# Patient Record
Sex: FEMALE | Race: WHITE | Employment: UNEMPLOYED | URBAN - METROPOLITAN AREA
[De-identification: names, ages, dates, MRNs, and addresses within clinical notes are randomized per-mention and may not be internally consistent; named-entity substitution may affect disease eponyms.]

---

## 2018-09-26 ENCOUNTER — APPOINTMENT (OUTPATIENT)
Dept: CT IMAGING | Age: 73
DRG: 042 | End: 2018-09-26
Payer: MEDICARE

## 2018-09-26 ENCOUNTER — APPOINTMENT (OUTPATIENT)
Dept: GENERAL RADIOLOGY | Age: 73
DRG: 042 | End: 2018-09-26
Payer: MEDICARE

## 2018-09-26 ENCOUNTER — APPOINTMENT (OUTPATIENT)
Dept: MRI IMAGING | Age: 73
DRG: 042 | End: 2018-09-26
Payer: MEDICARE

## 2018-09-26 ENCOUNTER — HOSPITAL ENCOUNTER (INPATIENT)
Age: 73
LOS: 6 days | Discharge: HOME | DRG: 042 | End: 2018-10-02
Attending: EMERGENCY MEDICINE | Admitting: NEUROLOGICAL SURGERY
Payer: MEDICARE

## 2018-09-26 DIAGNOSIS — I63.9 ISCHEMIC CEREBROVASCULAR ACCIDENT (CVA) (HCC): Primary | ICD-10-CM

## 2018-09-26 PROBLEM — R47.01 EXPRESSIVE APHASIA: Status: ACTIVE | Noted: 2018-09-26

## 2018-09-26 LAB
% CKMB: 5.4 % (ref 0–3)
ABO/RH: NORMAL
ABSOLUTE EOS #: <0.03 K/UL (ref 0–0.44)
ABSOLUTE IMMATURE GRANULOCYTE: 0.05 K/UL (ref 0–0.3)
ABSOLUTE LYMPH #: 1.37 K/UL (ref 1.1–3.7)
ABSOLUTE MONO #: 0.7 K/UL (ref 0.1–1.2)
ANION GAP SERPL CALCULATED.3IONS-SCNC: 13 MMOL/L (ref 9–17)
ANTIBODY IDENTIFICATION: NORMAL
ANTIBODY SCREEN: POSITIVE
ARM BAND NUMBER: NORMAL
BASOPHILS # BLD: 1 % (ref 0–2)
BASOPHILS ABSOLUTE: 0.09 K/UL (ref 0–0.2)
BUN BLDV-MCNC: 10 MG/DL (ref 8–23)
BUN/CREAT BLD: ABNORMAL (ref 9–20)
CALCIUM SERPL-MCNC: 10.1 MG/DL (ref 8.6–10.4)
CHLORIDE BLD-SCNC: 102 MMOL/L (ref 98–107)
CK MB: 5.4 NG/ML
CKMB INTERPRETATION: ABNORMAL
CO2: 30 MMOL/L (ref 20–31)
CREAT SERPL-MCNC: 0.49 MG/DL (ref 0.5–0.9)
DAT IGG: NEGATIVE
DIFFERENTIAL TYPE: ABNORMAL
EOSINOPHILS RELATIVE PERCENT: 0 % (ref 1–4)
EXPIRATION DATE: NORMAL
GFR AFRICAN AMERICAN: >60 ML/MIN
GFR NON-AFRICAN AMERICAN: >60 ML/MIN
GFR SERPL CREATININE-BSD FRML MDRD: ABNORMAL ML/MIN/{1.73_M2}
GFR SERPL CREATININE-BSD FRML MDRD: ABNORMAL ML/MIN/{1.73_M2}
GLUCOSE BLD-MCNC: 123 MG/DL (ref 70–99)
HCT VFR BLD CALC: 47.8 % (ref 36.3–47.1)
HEMOGLOBIN: 15.4 G/DL (ref 11.9–15.1)
IMMATURE GRANULOCYTES: 1 %
INR BLD: 0.9
LYMPHOCYTES # BLD: 14 % (ref 24–43)
MCH RBC QN AUTO: 30.2 PG (ref 25.2–33.5)
MCHC RBC AUTO-ENTMCNC: 32.2 G/DL (ref 28.4–34.8)
MCV RBC AUTO: 93.7 FL (ref 82.6–102.9)
MONOCYTES # BLD: 7 % (ref 3–12)
MYOGLOBIN: 53 NG/ML (ref 25–58)
NRBC AUTOMATED: 0 PER 100 WBC
PARTIAL THROMBOPLASTIN TIME: 23.9 SEC (ref 20.5–30.5)
PDW BLD-RTO: 12.7 % (ref 11.8–14.4)
PLATELET # BLD: 156 K/UL (ref 138–453)
PLATELET ESTIMATE: ABNORMAL
PMV BLD AUTO: 12.8 FL (ref 8.1–13.5)
POTASSIUM SERPL-SCNC: 3.3 MMOL/L (ref 3.7–5.3)
PROTHROMBIN TIME: 10.2 SEC (ref 9–12)
RBC # BLD: 5.1 M/UL (ref 3.95–5.11)
RBC # BLD: ABNORMAL 10*6/UL
SEG NEUTROPHILS: 77 % (ref 36–65)
SEGMENTED NEUTROPHILS ABSOLUTE COUNT: 7.81 K/UL (ref 1.5–8.1)
SODIUM BLD-SCNC: 145 MMOL/L (ref 135–144)
TOTAL CK: 100 U/L (ref 26–192)
TROPONIN INTERP: ABNORMAL
TROPONIN INTERP: NORMAL
TROPONIN T: <0.03 NG/ML
TROPONIN T: <0.03 NG/ML
WBC # BLD: 10 K/UL (ref 3.5–11.3)
WBC # BLD: ABNORMAL 10*3/UL

## 2018-09-26 PROCEDURE — 6360000002 HC RX W HCPCS: Performed by: EMERGENCY MEDICINE

## 2018-09-26 PROCEDURE — 86900 BLOOD TYPING SEROLOGIC ABO: CPT

## 2018-09-26 PROCEDURE — 85730 THROMBOPLASTIN TIME PARTIAL: CPT

## 2018-09-26 PROCEDURE — 84484 ASSAY OF TROPONIN QUANT: CPT

## 2018-09-26 PROCEDURE — 0042T CT BRAIN PERFUSION: CPT

## 2018-09-26 PROCEDURE — 82550 ASSAY OF CK (CPK): CPT

## 2018-09-26 PROCEDURE — 86850 RBC ANTIBODY SCREEN: CPT

## 2018-09-26 PROCEDURE — S0028 INJECTION, FAMOTIDINE, 20 MG: HCPCS | Performed by: EMERGENCY MEDICINE

## 2018-09-26 PROCEDURE — 85025 COMPLETE CBC W/AUTO DIFF WBC: CPT

## 2018-09-26 PROCEDURE — 2000000003 HC NEURO ICU R&B

## 2018-09-26 PROCEDURE — 36415 COLL VENOUS BLD VENIPUNCTURE: CPT

## 2018-09-26 PROCEDURE — 99285 EMERGENCY DEPT VISIT HI MDM: CPT

## 2018-09-26 PROCEDURE — 93005 ELECTROCARDIOGRAM TRACING: CPT

## 2018-09-26 PROCEDURE — 99291 CRITICAL CARE FIRST HOUR: CPT | Performed by: NEUROLOGICAL SURGERY

## 2018-09-26 PROCEDURE — 6360000004 HC RX CONTRAST MEDICATION: Performed by: STUDENT IN AN ORGANIZED HEALTH CARE EDUCATION/TRAINING PROGRAM

## 2018-09-26 PROCEDURE — 85610 PROTHROMBIN TIME: CPT

## 2018-09-26 PROCEDURE — 86880 COOMBS TEST DIRECT: CPT

## 2018-09-26 PROCEDURE — 99222 1ST HOSP IP/OBS MODERATE 55: CPT | Performed by: PSYCHIATRY & NEUROLOGY

## 2018-09-26 PROCEDURE — 94762 N-INVAS EAR/PLS OXIMTRY CONT: CPT

## 2018-09-26 PROCEDURE — 86870 RBC ANTIBODY IDENTIFICATION: CPT

## 2018-09-26 PROCEDURE — 2500000003 HC RX 250 WO HCPCS: Performed by: EMERGENCY MEDICINE

## 2018-09-26 PROCEDURE — 82553 CREATINE MB FRACTION: CPT

## 2018-09-26 PROCEDURE — 71045 X-RAY EXAM CHEST 1 VIEW: CPT

## 2018-09-26 PROCEDURE — 2580000003 HC RX 258: Performed by: STUDENT IN AN ORGANIZED HEALTH CARE EDUCATION/TRAINING PROGRAM

## 2018-09-26 PROCEDURE — 80048 BASIC METABOLIC PNL TOTAL CA: CPT

## 2018-09-26 PROCEDURE — 6360000002 HC RX W HCPCS: Performed by: NURSE PRACTITIONER

## 2018-09-26 PROCEDURE — 2580000003 HC RX 258: Performed by: NURSE PRACTITIONER

## 2018-09-26 PROCEDURE — 70496 CT ANGIOGRAPHY HEAD: CPT

## 2018-09-26 PROCEDURE — 86901 BLOOD TYPING SEROLOGIC RH(D): CPT

## 2018-09-26 PROCEDURE — 83874 ASSAY OF MYOGLOBIN: CPT

## 2018-09-26 PROCEDURE — 70498 CT ANGIOGRAPHY NECK: CPT

## 2018-09-26 RX ORDER — METHYLPREDNISOLONE SODIUM SUCCINATE 125 MG/2ML
125 INJECTION, POWDER, LYOPHILIZED, FOR SOLUTION INTRAMUSCULAR; INTRAVENOUS ONCE
Status: COMPLETED | OUTPATIENT
Start: 2018-09-26 | End: 2018-09-26

## 2018-09-26 RX ORDER — PANTOPRAZOLE SODIUM 40 MG/1
40 TABLET, DELAYED RELEASE ORAL
Status: DISCONTINUED | OUTPATIENT
Start: 2018-09-27 | End: 2018-10-02 | Stop reason: HOSPADM

## 2018-09-26 RX ORDER — DEXTROSE MONOHYDRATE 25 G/50ML
12.5 INJECTION, SOLUTION INTRAVENOUS
Status: ACTIVE | OUTPATIENT
Start: 2018-09-26 | End: 2018-09-26

## 2018-09-26 RX ORDER — SODIUM CHLORIDE 0.9 % (FLUSH) 0.9 %
10 SYRINGE (ML) INJECTION PRN
Status: DISCONTINUED | OUTPATIENT
Start: 2018-09-26 | End: 2018-10-02 | Stop reason: HOSPADM

## 2018-09-26 RX ORDER — SODIUM CHLORIDE 9 MG/ML
INJECTION, SOLUTION INTRAVENOUS CONTINUOUS
Status: DISCONTINUED | OUTPATIENT
Start: 2018-09-26 | End: 2018-09-28

## 2018-09-26 RX ORDER — LEVETIRACETAM 15 MG/ML
1500 INJECTION INTRAVASCULAR ONCE
Status: COMPLETED | OUTPATIENT
Start: 2018-09-26 | End: 2018-09-26

## 2018-09-26 RX ORDER — 0.9 % SODIUM CHLORIDE 0.9 %
500 INTRAVENOUS SOLUTION INTRAVENOUS ONCE
Status: COMPLETED | OUTPATIENT
Start: 2018-09-26 | End: 2018-09-26

## 2018-09-26 RX ORDER — ACETAMINOPHEN 325 MG/1
650 TABLET ORAL EVERY 4 HOURS PRN
Status: DISCONTINUED | OUTPATIENT
Start: 2018-09-26 | End: 2018-10-02 | Stop reason: HOSPADM

## 2018-09-26 RX ORDER — HYDRALAZINE HYDROCHLORIDE 25 MG/1
12.5 TABLET, FILM COATED ORAL DAILY
COMMUNITY

## 2018-09-26 RX ORDER — PRAVASTATIN SODIUM 40 MG
40 TABLET ORAL DAILY
Status: ON HOLD | COMMUNITY
End: 2018-10-02 | Stop reason: HOSPADM

## 2018-09-26 RX ORDER — POTASSIUM CHLORIDE 7.45 MG/ML
10 INJECTION INTRAVENOUS
Status: COMPLETED | OUTPATIENT
Start: 2018-09-26 | End: 2018-09-26

## 2018-09-26 RX ORDER — SODIUM CHLORIDE 0.9 % (FLUSH) 0.9 %
10 SYRINGE (ML) INJECTION EVERY 12 HOURS SCHEDULED
Status: DISCONTINUED | OUTPATIENT
Start: 2018-09-26 | End: 2018-10-02 | Stop reason: HOSPADM

## 2018-09-26 RX ORDER — LABETALOL HYDROCHLORIDE 5 MG/ML
10 INJECTION, SOLUTION INTRAVENOUS
Status: DISCONTINUED | OUTPATIENT
Start: 2018-09-26 | End: 2018-10-02 | Stop reason: HOSPADM

## 2018-09-26 RX ORDER — FENTANYL CITRATE 50 UG/ML
50 INJECTION, SOLUTION INTRAMUSCULAR; INTRAVENOUS
Status: DISCONTINUED | OUTPATIENT
Start: 2018-09-26 | End: 2018-10-02 | Stop reason: HOSPADM

## 2018-09-26 RX ORDER — TROSPIUM CHLORIDE 20 MG/1
20 TABLET, FILM COATED ORAL
Status: DISCONTINUED | OUTPATIENT
Start: 2018-09-27 | End: 2018-10-02 | Stop reason: HOSPADM

## 2018-09-26 RX ORDER — SODIUM CHLORIDE 9 MG/ML
INJECTION, SOLUTION INTRAVENOUS CONTINUOUS
Status: DISCONTINUED | OUTPATIENT
Start: 2018-09-26 | End: 2018-09-26

## 2018-09-26 RX ORDER — SIMVASTATIN 20 MG
20 TABLET ORAL NIGHTLY
Status: DISCONTINUED | OUTPATIENT
Start: 2018-09-26 | End: 2018-09-27

## 2018-09-26 RX ORDER — ONDANSETRON 2 MG/ML
4 INJECTION INTRAMUSCULAR; INTRAVENOUS EVERY 6 HOURS PRN
Status: DISCONTINUED | OUTPATIENT
Start: 2018-09-26 | End: 2018-10-02 | Stop reason: HOSPADM

## 2018-09-26 RX ORDER — PROMETHAZINE HYDROCHLORIDE 25 MG/ML
12.5 INJECTION, SOLUTION INTRAMUSCULAR; INTRAVENOUS EVERY 6 HOURS PRN
Status: DISCONTINUED | OUTPATIENT
Start: 2018-09-26 | End: 2018-10-02 | Stop reason: HOSPADM

## 2018-09-26 RX ORDER — DIPHENHYDRAMINE HYDROCHLORIDE 50 MG/ML
50 INJECTION INTRAMUSCULAR; INTRAVENOUS ONCE
Status: COMPLETED | OUTPATIENT
Start: 2018-09-26 | End: 2018-09-26

## 2018-09-26 RX ORDER — LABETALOL HYDROCHLORIDE 5 MG/ML
10 INJECTION, SOLUTION INTRAVENOUS
Status: DISCONTINUED | OUTPATIENT
Start: 2018-09-26 | End: 2018-09-26

## 2018-09-26 RX ORDER — FENTANYL CITRATE 50 UG/ML
25 INJECTION, SOLUTION INTRAMUSCULAR; INTRAVENOUS
Status: DISCONTINUED | OUTPATIENT
Start: 2018-09-26 | End: 2018-10-02 | Stop reason: HOSPADM

## 2018-09-26 RX ADMIN — LEVETIRACETAM 1500 MG: 15 INJECTION INTRAVENOUS at 16:44

## 2018-09-26 RX ADMIN — SODIUM CHLORIDE 500 ML: 0.9 INJECTION, SOLUTION INTRAVENOUS at 16:13

## 2018-09-26 RX ADMIN — POTASSIUM CHLORIDE 10 MEQ: 10 INJECTION, SOLUTION INTRAVENOUS at 20:57

## 2018-09-26 RX ADMIN — METHYLPREDNISOLONE SODIUM SUCCINATE 125 MG: 125 INJECTION, POWDER, FOR SOLUTION INTRAMUSCULAR; INTRAVENOUS at 17:16

## 2018-09-26 RX ADMIN — IOPAMIDOL 50 ML: 755 INJECTION, SOLUTION INTRAVENOUS at 15:20

## 2018-09-26 RX ADMIN — Medication 10 ML: at 21:58

## 2018-09-26 RX ADMIN — POTASSIUM CHLORIDE 10 MEQ: 10 INJECTION, SOLUTION INTRAVENOUS at 23:09

## 2018-09-26 RX ADMIN — SODIUM CHLORIDE: 9 INJECTION, SOLUTION INTRAVENOUS at 16:44

## 2018-09-26 RX ADMIN — POTASSIUM CHLORIDE 10 MEQ: 10 INJECTION, SOLUTION INTRAVENOUS at 19:32

## 2018-09-26 RX ADMIN — FAMOTIDINE 20 MG: 10 INJECTION, SOLUTION INTRAVENOUS at 17:16

## 2018-09-26 RX ADMIN — IOPAMIDOL 90 ML: 755 INJECTION, SOLUTION INTRAVENOUS at 15:21

## 2018-09-26 RX ADMIN — POTASSIUM CHLORIDE 10 MEQ: 10 INJECTION, SOLUTION INTRAVENOUS at 21:58

## 2018-09-26 RX ADMIN — DIPHENHYDRAMINE HYDROCHLORIDE 50 MG: 50 INJECTION, SOLUTION INTRAMUSCULAR; INTRAVENOUS at 17:16

## 2018-09-26 NOTE — FLOWSHEET NOTE
[facility]     Emergency/Trauma Note    PATIENT NAME: Danielle Corcoran    Shift date: 9.26.2018  Shift day: Wednesday   Shift # 1    Room # 0526/0526-01   Name: Ana Niño            Age: 74 y.o. Gender: female          Anabaptism: None   Place of Mu-ism: unknown    Trauma/Incident type: Stroke Alert  Admit Date & Time: 9/26/2018  2:42 PM  TRAUMA NAME: none        PATIENT/EVENT DESCRIPTION:  Ana Niño is a 74 y.o. female who arrived as a transfer and paged as a stroke alert. Pt to be admitted to 0526/0526-01. SPIRITUAL ASSESSMENT/INTERVENTION:  Met with daughter who arrived before the patient. Daughter says that she came from two hours away. Once patient arrived,  escorted daughter back. Patient was not able to speak and indicated that she as very anxious.  reassured patient that she was in good hands and prayed for her and the family. PATIENT BELONGINGS:  No belongings noted    ANY BELONGINGS OF SIGNIFICANT VALUE NOTED:  none    REGISTRATION STAFF NOTIFIED? Yes      WHAT IS YOUR SPIRITUAL CARE PLAN FOR THIS PATIENT?:   Chaplains will remain available to provide spiritual and emotional care as needed    Electronically signed by Venkata Olson, on 9/26/2018 at 7:53 PM.  [facility]  [phone]       09/26/18 1500   Encounter Summary   Services provided to: Patient; Family   Referral/Consult From: Multi-disciplinary team   Support System Children   Continue Visiting ([phone])   Complexity of Encounter High   Length of Encounter 1 hour   Spiritual Assessment Completed Yes   Crisis   Type Stroke Alert   Assessment Anxious; Fearful;Unable to respond   Intervention Explored feelings, thoughts, concerns;Explored coping resources;Prayer; Active listening   Outcome Expressed gratitude     Electronically signed by Sugey Danielle on 9/26/2018 at 7:54 PM

## 2018-09-26 NOTE — ED NOTES
Report called to 5B RN. Pt to go to MRI first and then the floor.      Radha Fowler RN  09/26/18 3381

## 2018-09-26 NOTE — ED NOTES
Pt can state \"a little bit\", which is an improvement for speech when asked if she can talk a little better yet.      Aby Broussard RN  09/26/18 [id number]

## 2018-09-26 NOTE — CARE COORDINATION
Case Management Initial Discharge Plan  Titi Heredia,             Met with:patient and 2 daughters to discuss discharge plans. Information verified: address, contacts, phone number, , insurance Yes  PCP: Michoacano Melgar  Date of last visit:     Insurance Provider: [facility]    Discharge Planning    Living Arrangements:  Alone   Support Systems:  Children, Family Members    Home has 1 stories  0 stairs to climb to get into front door, na stairs to climb to reach second floor  Location of bedroom/bathroom in home first floor     Patient able to perform ADL's:Independent    Current Services (outpatient & in home) none  DME equipment: none   DME provider: none     Pharmacy: [facility] in [place] Medications:  No  Does patient want to participate in local refill/ meds to beds program?  No    Potential Assistance Needed:  N/A    Patient agreeable to home care: Yes  Freedom of choice provided:  n/a    Prior SNF/Rehab Placement and Facility: no  Agreeable to SNF/Rehab: Yes  [place] of choice provided: n/a   Evaluation: no    Expected Discharge date:     Patient expects to be discharged to: Follow Up Appointment: Best Day/ Time:      Transportation provider: Family   Transportation arrangements needed for discharge: No    Readmission Risk              Risk of Unplanned Readmission:        0               Does patient have a readmission risk score greater than 14?: No  If yes, follow-up appointment must be made within 7 days of discharge. Discharge Plan: discharge plan to be determined.           Electronically signed by Spencer Richardson RN on 18 at 5:23 PM

## 2018-09-26 NOTE — ED PROVIDER NOTES
[facility]     Emergency Department     Faculty Note/ Attestation      Pt Name: Alyssa Zuniga                                       MRN: [id number]  Ar[place]gfurt 1945  Date of evaluation: 9/26/2018    Patients PCP:    Kortney Vargas  I performed a history and physical examination of the patient and discussed management with the resident. I reviewed the residents note and agree with the documented findings and plan of care. Any areas of disagreement are noted on the chart. I was personally present for the key portions of any procedures. I have documented in the chart those procedures where I was not present during the key portions. I have reviewed the emergency nurses triage note. I agree with the chief complaint, past medical history, past surgical history, allergies, medications, social and family history as documented unless otherwise noted below. For Physician Assistant/ Nurse Practitioner cases/documentation I have personally evaluated this patient and have completed at least one if not all key elements of the E/M (history, physical exam, and MDM). Additional findings are as noted. Initial Screens:  NIH Stroke Scale  NIH Stroke Scale Assessed: Yes  Interval: Baseline  Level of Consciousness (1a. ): Alert  LOC Questions (1b. ):  Incorrect  LOC Commands (1c. ): Obeys both correctly  Best Gaze (2. ): Normal  Visual (3. ): No visual loss  Facial Palsy (4. ): Normal  Motor Arm, Left (5a. ): No drift  Motor Arm, Right (5b. ): No drift  Motor Leg, Left (6a. ): No drift  Motor Leg, Right (6b. ): No drift  Limb Ataxia (7. ): Absent  Sensory (8. ): Normal  Best Language (9. ): Mute  Dysarthria (10. ): Near to unintelligible  Extinction and Inattention (11): No neglect  Total: 7     [place] Coma Scale  Eye Opening: Spontaneous  Best Verbal Response: Oriented  Best Motor Response: Obeys commands  Jason Coma Scale Score: 15    Vitals:    Vitals:    09/26/18 1449   BP: (!)
 CT Brain Perfusion    STROKE PANEL    Inpatient consult to Stroke Team     MEDICATION ORDERS:  No orders of the defined types were placed in this encounter. Differential diagnosis      Stroke/ TIA/ Facial Droop DDX:   Stroke/ TIA, Vascular, Infectious/inflammatory, Neoplastic/neurological, Drug induced, Iatrogenic, Cardiopulmonary, Autoimmune, Endocrine, Degenerative, Psychogenic/psychiatric, MSK    INITIAL NIH STROKE SCALE:  NIH Stroke Scale  NIH Stroke Scale Assessed: Yes  Interval: Baseline  Level of Consciousness (1a. ): Alert  LOC Questions (1b. ): Incorrect  LOC Commands (1c. ): Obeys both correctly  Best Gaze (2. ): Normal  Visual (3. ): No visual loss  Facial Palsy (4. ): Normal  Motor Arm, Left (5a. ): No drift  Motor Arm, Right (5b. ): No drift  Motor Leg, Left (6a. ): No drift  Motor Leg, Right (6b. ): No drift  Limb Ataxia (7. ): Absent  Sensory (8. ): Normal  Best Language (9. ): Mute  Dysarthria (10. ): Near to unintelligible  Extinction and Inattention (11): No neglect  Total: 7    0 = no stroke  14 = minor stroke  515 = moderate stroke  1520 = moderate/severe stroke  2142 = severe stroke    TPA in Acute Stroke:    When was the patient's last known well (LKW)? 11 AM   Did the patient arrive in the ED within 2 hours of LKW? No   Was TPA initiated within 3 hours of the LKW? Yes     If the patient arrived within 2 hours of LKW and did not receive TPA, what was the reason?      Diagnostic Results     LABS:  Results for orders placed or performed during the hospital encounter of 09/26/18   STROKE PANEL   Result Value Ref Range    WBC 10.0 3.5 - 11.3 k/uL    RBC 5.10 3.95 - 5.11 m/uL    Hemoglobin 15.4 (H) 11.9 - 15.1 g/dL    Hematocrit 47.8 (H) 36.3 - 47.1 %    MCV 93.7 82.6 - 102.9 fL    MCH 30.2 25.2 - 33.5 pg    MCHC 32.2 28.4 - 34.8 g/dL    RDW 12.7 11.8 - 14.4 %    Platelets 830 119 - 203 k/uL    MPV 12.8 8.1 - 13.5 fL    NRBC Automated 0.0 0.0 per 100 WBC    Differential Type NOT REPORTED
RECENT VITALS:     Temp: 97.9 °F (36.6 °C),  Pulse: 61, Resp: 16, BP: (!) 141/63, SpO2: 100 %    This patient is a 74 y.o. Female with strokelike symptoms with expressive aphasia. Patient given TPA. Patient admitted to neuro critical care unit. Pending bed placement. Approximately 5:00 patient began to develop swelling to anterior upper lip. Patient has no other swelling of her lips, no tongue swelling, no feeling like her throat is closing. This time we initiated protocol for angioedema induced by TPA. Patient given 125 mg Solu-Medrol, 50 mg Benadryl, 20 mg Pepcid. I spoke with neuro critical care physician assistant who recommended no MRI at this time and to have patient transferred to floor. OUTSTANDING TASKS / RECOMMENDATIONS:    1. Pending transfer to floor     FINAL IMPRESSION:     1.  Ischemic cerebrovascular accident (CVA) [facility])        DISPOSITION:         DISPOSITION:  []  Discharge   [x]  Transfer -  [place]   []  Admission -     []  Against Medical Advice   []  [place]   FOLLOW-UP: [address] [phone]           DISCHARGE MEDICATIONS: Current Discharge Medication List              Ravinder Guerrero DO  Emergency Medicine Resident  [address] Ravinder Guerrero [place]  Resident  09/26/18 6249

## 2018-09-27 ENCOUNTER — APPOINTMENT (OUTPATIENT)
Dept: MRI IMAGING | Age: 73
DRG: 042 | End: 2018-09-27
Payer: MEDICARE

## 2018-09-27 ENCOUNTER — APPOINTMENT (OUTPATIENT)
Dept: GENERAL RADIOLOGY | Age: 73
DRG: 042 | End: 2018-09-27
Payer: MEDICARE

## 2018-09-27 LAB
ABSOLUTE EOS #: <0.03 K/UL (ref 0–0.44)
ABSOLUTE IMMATURE GRANULOCYTE: 0.03 K/UL (ref 0–0.3)
ABSOLUTE LYMPH #: 0.72 K/UL (ref 1.1–3.7)
ABSOLUTE MONO #: 0.04 K/UL (ref 0.1–1.2)
ANION GAP SERPL CALCULATED.3IONS-SCNC: 10 MMOL/L (ref 9–17)
BASOPHILS # BLD: 0 % (ref 0–2)
BASOPHILS ABSOLUTE: <0.03 K/UL (ref 0–0.2)
BUN BLDV-MCNC: 15 MG/DL (ref 8–23)
BUN/CREAT BLD: ABNORMAL (ref 9–20)
CALCIUM IONIZED: 1.13 MMOL/L (ref 1.13–1.33)
CALCIUM SERPL-MCNC: 9.2 MG/DL (ref 8.6–10.4)
CHLORIDE BLD-SCNC: 106 MMOL/L (ref 98–107)
CHOLESTEROL/HDL RATIO: 1.8
CHOLESTEROL: 199 MG/DL
CO2: 25 MMOL/L (ref 20–31)
CREAT SERPL-MCNC: 0.57 MG/DL (ref 0.5–0.9)
DIFFERENTIAL TYPE: ABNORMAL
EOSINOPHILS RELATIVE PERCENT: 0 % (ref 1–4)
ESTIMATED AVERAGE GLUCOSE: 117 MG/DL
GFR AFRICAN AMERICAN: >60 ML/MIN
GFR NON-AFRICAN AMERICAN: >60 ML/MIN
GFR SERPL CREATININE-BSD FRML MDRD: ABNORMAL ML/MIN/{1.73_M2}
GFR SERPL CREATININE-BSD FRML MDRD: ABNORMAL ML/MIN/{1.73_M2}
GLUCOSE BLD-MCNC: 156 MG/DL (ref 70–99)
HBA1C MFR BLD: 5.7 % (ref 4–6)
HCT VFR BLD CALC: 46 % (ref 36.3–47.1)
HDLC SERPL-MCNC: 112 MG/DL
HEMOGLOBIN: 14.7 G/DL (ref 11.9–15.1)
IMMATURE GRANULOCYTES: 0 %
LDL CHOLESTEROL: 79 MG/DL (ref 0–130)
LV EF: 55 %
LVEF MODALITY: NORMAL
LYMPHOCYTES # BLD: 8 % (ref 24–43)
MAGNESIUM: 2 MG/DL (ref 1.6–2.6)
MCH RBC QN AUTO: 30.8 PG (ref 25.2–33.5)
MCHC RBC AUTO-ENTMCNC: 32 G/DL (ref 28.4–34.8)
MCV RBC AUTO: 96.2 FL (ref 82.6–102.9)
MONOCYTES # BLD: 1 % (ref 3–12)
NRBC AUTOMATED: 0 PER 100 WBC
PARTIAL THROMBOPLASTIN TIME: 25.3 SEC (ref 20.5–30.5)
PARTIAL THROMBOPLASTIN TIME: 44.6 SEC (ref 20.5–30.5)
PARTIAL THROMBOPLASTIN TIME: 73 SEC (ref 20.5–30.5)
PDW BLD-RTO: 13 % (ref 11.8–14.4)
PHOSPHORUS: 3.2 MG/DL (ref 2.6–4.5)
PLATELET # BLD: ABNORMAL K/UL (ref 138–453)
PLATELET ESTIMATE: ABNORMAL
PLATELET, FLUORESCENCE: 173 K/UL (ref 138–453)
PLATELET, IMMATURE FRACTION: 20.4 % (ref 1.1–10.3)
PMV BLD AUTO: ABNORMAL FL (ref 8.1–13.5)
POTASSIUM SERPL-SCNC: 3.8 MMOL/L (ref 3.7–5.3)
RBC # BLD: 4.78 M/UL (ref 3.95–5.11)
RBC # BLD: ABNORMAL 10*6/UL
SEG NEUTROPHILS: 91 % (ref 36–65)
SEGMENTED NEUTROPHILS ABSOLUTE COUNT: 7.77 K/UL (ref 1.5–8.1)
SODIUM BLD-SCNC: 141 MMOL/L (ref 135–144)
TRIGL SERPL-MCNC: 39 MG/DL
VLDLC SERPL CALC-MCNC: NORMAL MG/DL (ref 1–30)
WBC # BLD: 8.7 K/UL (ref 3.5–11.3)
WBC # BLD: ABNORMAL 10*3/UL

## 2018-09-27 PROCEDURE — 6360000002 HC RX W HCPCS: Performed by: STUDENT IN AN ORGANIZED HEALTH CARE EDUCATION/TRAINING PROGRAM

## 2018-09-27 PROCEDURE — 70551 MRI BRAIN STEM W/O DYE: CPT

## 2018-09-27 PROCEDURE — 85025 COMPLETE CBC W/AUTO DIFF WBC: CPT

## 2018-09-27 PROCEDURE — 6370000000 HC RX 637 (ALT 250 FOR IP): Performed by: NURSE PRACTITIONER

## 2018-09-27 PROCEDURE — G8997 SWALLOW GOAL STATUS: HCPCS

## 2018-09-27 PROCEDURE — 80048 BASIC METABOLIC PNL TOTAL CA: CPT

## 2018-09-27 PROCEDURE — 2000000003 HC NEURO ICU R&B

## 2018-09-27 PROCEDURE — 99291 CRITICAL CARE FIRST HOUR: CPT | Performed by: NEUROLOGICAL SURGERY

## 2018-09-27 PROCEDURE — 92523 SPEECH SOUND LANG COMPREHEN: CPT

## 2018-09-27 PROCEDURE — 82330 ASSAY OF CALCIUM: CPT

## 2018-09-27 PROCEDURE — G0008 ADMIN INFLUENZA VIRUS VAC: HCPCS | Performed by: NEUROLOGICAL SURGERY

## 2018-09-27 PROCEDURE — 92610 EVALUATE SWALLOWING FUNCTION: CPT

## 2018-09-27 PROCEDURE — 6370000000 HC RX 637 (ALT 250 FOR IP): Performed by: STUDENT IN AN ORGANIZED HEALTH CARE EDUCATION/TRAINING PROGRAM

## 2018-09-27 PROCEDURE — 85730 THROMBOPLASTIN TIME PARTIAL: CPT

## 2018-09-27 PROCEDURE — G9174 SPEECH LANG CURRENT STATUS: HCPCS

## 2018-09-27 PROCEDURE — G8996 SWALLOW CURRENT STATUS: HCPCS

## 2018-09-27 PROCEDURE — 84100 ASSAY OF PHOSPHORUS: CPT

## 2018-09-27 PROCEDURE — G8979 MOBILITY GOAL STATUS: HCPCS

## 2018-09-27 PROCEDURE — 74230 X-RAY XM SWLNG FUNCJ C+: CPT

## 2018-09-27 PROCEDURE — G8987 SELF CARE CURRENT STATUS: HCPCS

## 2018-09-27 PROCEDURE — 97530 THERAPEUTIC ACTIVITIES: CPT

## 2018-09-27 PROCEDURE — 97162 PT EVAL MOD COMPLEX 30 MIN: CPT

## 2018-09-27 PROCEDURE — G8988 SELF CARE GOAL STATUS: HCPCS

## 2018-09-27 PROCEDURE — 85055 RETICULATED PLATELET ASSAY: CPT

## 2018-09-27 PROCEDURE — 97165 OT EVAL LOW COMPLEX 30 MIN: CPT

## 2018-09-27 PROCEDURE — 90686 IIV4 VACC NO PRSV 0.5 ML IM: CPT | Performed by: NEUROLOGICAL SURGERY

## 2018-09-27 PROCEDURE — 83735 ASSAY OF MAGNESIUM: CPT

## 2018-09-27 PROCEDURE — 2580000003 HC RX 258: Performed by: NURSE PRACTITIONER

## 2018-09-27 PROCEDURE — 92611 MOTION FLUOROSCOPY/SWALLOW: CPT

## 2018-09-27 PROCEDURE — 97535 SELF CARE MNGMENT TRAINING: CPT

## 2018-09-27 PROCEDURE — 83036 HEMOGLOBIN GLYCOSYLATED A1C: CPT

## 2018-09-27 PROCEDURE — 6360000002 HC RX W HCPCS: Performed by: NEUROLOGICAL SURGERY

## 2018-09-27 PROCEDURE — 36415 COLL VENOUS BLD VENIPUNCTURE: CPT

## 2018-09-27 PROCEDURE — G8978 MOBILITY CURRENT STATUS: HCPCS

## 2018-09-27 PROCEDURE — G9175 SPEECH LANG GOAL STATUS: HCPCS

## 2018-09-27 PROCEDURE — 93306 TTE W/DOPPLER COMPLETE: CPT

## 2018-09-27 PROCEDURE — 80061 LIPID PANEL: CPT

## 2018-09-27 RX ORDER — HEPARIN SODIUM 10000 [USP'U]/100ML
12 INJECTION, SOLUTION INTRAVENOUS CONTINUOUS
Status: DISCONTINUED | OUTPATIENT
Start: 2018-09-27 | End: 2018-09-28

## 2018-09-27 RX ORDER — ATORVASTATIN CALCIUM 40 MG/1
40 TABLET, FILM COATED ORAL NIGHTLY
Status: DISCONTINUED | OUTPATIENT
Start: 2018-09-27 | End: 2018-10-02 | Stop reason: HOSPADM

## 2018-09-27 RX ADMIN — PANTOPRAZOLE SODIUM 40 MG: 40 TABLET, DELAYED RELEASE ORAL at 08:17

## 2018-09-27 RX ADMIN — TROSPIUM CHLORIDE 20 MG: 20 TABLET, FILM COATED ORAL at 17:38

## 2018-09-27 RX ADMIN — HEPARIN SODIUM AND DEXTROSE 12 UNITS/KG/HR: 10000; 5 INJECTION INTRAVENOUS at 09:57

## 2018-09-27 RX ADMIN — TROSPIUM CHLORIDE 20 MG: 20 TABLET, FILM COATED ORAL at 08:17

## 2018-09-27 RX ADMIN — Medication 10 ML: at 08:17

## 2018-09-27 RX ADMIN — DESMOPRESSIN ACETATE 40 MG: 0.2 TABLET ORAL at 21:05

## 2018-09-27 RX ADMIN — INFLUENZA A VIRUS A/MICHIGAN/45/2015 X-275 (H1N1) ANTIGEN (FORMALDEHYDE INACTIVATED), INFLUENZA A VIRUS A/SINGAPORE/INFIMH-16-0019/2016 IVR-186 (H3N2) ANTIGEN (FORMALDEHYDE INACTIVATED), INFLUENZA B VIRUS B/PHUKET/3073/2013 ANTIGEN (FORMALDEHYDE INACTIVATED), AND INFLUENZA B VIRUS B/MARYLAND/15/2016 BX-69A ANTIGEN (FORMALDEHYDE INACTIVATED) 0.5 ML: 15; 15; 15; 15 INJECTION, SUSPENSION INTRAMUSCULAR at 08:29

## 2018-09-27 RX ADMIN — SODIUM CHLORIDE: 9 INJECTION, SOLUTION INTRAVENOUS at 14:33

## 2018-09-27 ASSESSMENT — PAIN SCALES - GENERAL
PAINLEVEL_OUTOF10: 0

## 2018-09-27 NOTE — H&P
Wrong template
Disease Maternal Grandfather     Heart Disease Paternal Grandfather        Allergies:    Patient has no known allergies. Medications Prior to Admission:    Not in a hospital admission. Current Medications:  No current facility-administered medications for this encounter. REVIEW OF SYSTEMS     CONSTITUTIONAL: negative for fatigue and malaise   EYES: negative for double vision and photophobia    HEENT: negative for tinnitus and sore throat   RESPIRATORY: negative for cough, shortness of breath   CARDIOVASCULAR: negative for chest pain, palpitations, or syncope   GASTROINTESTINAL: negative for abdominal pain, nausea, vomiting, diarrhea, or constipation    GENITOURINARY: negative for incontinence or retention    MUSCULOSKELETAL: negative for neck or back pain, negative for extremity pain   NEUROLOGICAL: Positive expressive aphasia. Negative for seizures, headaches, weakness, numbness, confusion, dysarthria. PSYCHIATRIC: negative for agitation, hallucination, SI/HI   SKIN Negative for spontaneous contusions, rashes, or lesions      ROS limited due to severe expressive aphasia (mute), but patient able to nod yes/no appropriately. PHYSICAL EXAM:     BP (!) 151/83   Pulse 90   Temp 98.1 °F (36.7 °C) (Oral)   Resp 15   Wt 107 lb (48.5 kg)   BMI 19.57 kg/m²     Estimated body mass index is 19.57 kg/m² as calculated from the following:    Height as of 9/18/12: 5' 2\" (1.575 m). Weight as of this encounter: 107 lb (48.5 kg).  []<16 Severe malnutrition  []1616.99 Moderate malnutrition  []1718.49 Mild malnutrition  [x]18.524.9 Normal  []2529.9 Overweight (not obese)  []3034.9 Obese class 1 (Low Risk)  []3539.9 Obese class 2 (Moderate Risk)  []?40 Obese class 3 (High Risk)    PHYSICAL EXAM:  CONSTITUTIONAL:  Well developed, well nourished, alert and oriented x3- able to nod appropriately, in no acute distress. GCS 15. Nontoxic. Severe expressive aphasia [facility]).    HEAD:  normocephalic, atraumatic

## 2018-09-27 NOTE — PROGRESS NOTES
Speech Language Pathology  Facility/Department: [address]   BEDSIDE SWALLOW EVALUATION    NAME: Erica Leach  : 1945  MRN: [id number]    ADMISSION DATE: 2018  ADMITTING DIAGNOSIS: has Ischemic cerebrovascular accident (CVA) ([address] and Expressive aphasia on her problem list.      Date of Eval: 2018  Evaluating Therapist: Kimberly Leigh    Current Diet level: NPO     Primary ComplaintLinda Corcoran is a 74 y.o. female who presented to the emergency department with complete aphasia. The last known well was  11:00 this AM. Patient was seen at [facility] and had a CT scan which was negative. The greater spoke to Dr. Felicita Lo. who agreed to accept the patient here. The patient was subsequently transferred by [facility] and given TPA with the bolus at 1402 followed by an infusion at 1403. NH reported by [facility] was a 3 - she was a 7 on arrival here. Pain:  Pain Assessment  Patient Currently in Pain: No  Pain Assessment: 0-10  Pain Level: 0    Reason for Referral  Erica Leach was referred for a bedside swallow evaluation to assess the efficiency of her swallow function, identify signs and symptoms of aspiration and make recommendations regarding safe dietary consistencies, effective compensatory strategies, and safe eating environment. Impression  Pt presents with +s/s of aspiration for soft solid and nectar thick liquid by tsp. Recommend pt remain NPO until MBSS completed to further assess swallow function  . Dysphagia Diagnosis: Moderate to severe pharyngeal stage dysphagia; Suspected needs further assessment  Dysphagia Outcome Severity Scale: Level 1: Severe dysphagia- NPO. Unable to tolerate any PO safely     Treatment Plan  Requires SLP Intervention: Yes  Duration/Frequency of Treatment: 3-5x/week  D/C Recommendations:  To be determined       Recommended Diet and Intervention  Diet Solids Recommendation: NPO  Liquid Consistency Recommendation: NPO     Therapeutic

## 2018-09-27 NOTE — PROCEDURES
intake and complete Modified Barium Swallow Study should they occur. Results and recommendations reported to RN. Patient Position: Lateral and Patient Degrees: 90      Consistencies Administered: Reg solid; Soft solid;Puree; Thin cup;Nectar  teaspoon      Recommended Diet:  Solid consistency: Regular  Liquid consistency: Thin  Liquid administration via: Cup    Medication administration: PO    Safe Swallow Protocol:  Supervision: Close  Compensatory Swallowing Strategies: Alternate solids and liquids;Eat/Feed slowly;Upright as possible for all oral intake;Small bites/sips      Recommendations/Treatment  Requires SLP Intervention: Yes        D/C Recommendations: Inpatient rehabilitation  Postural Changes and/or Swallow Maneuvers: Upright 90 degrees      Recommended Exercises:    Therapeutic Interventions: Laryngeal exercises;Diet tolerance monitoring; Tongue base strengthening;Pharyngeal exercises         Education: Images and recommendations were reviewed with pt following this exam.   Patient Education: yes  Patient Education Response: Verbalizes understanding    Prognosis  Prognosis for safe diet advancement: good  Duration/Frequency of Treatment  Duration/Frequency of Treatment: 3-5x week      Goals:    Long Term: To Maximize safety with intake, optimize nutrition/hydration and minimize risk for aspiration. Short Term:     Goal 1: O/M treatment program for pharyngeal stasis  Goal 2: The patient will tolerate recommended diet without observed clinical signs of aspiration      Oral Preparation / Oral Phase  Oral Phase: WFL  Oral Phase: A-P transit and oral manipulation functional for consistencies tested. Mild oral stasis noted    Pharyngeal Phase  Pharyngeal Phase: WFL    Pharyngeal: Thin cup: trace flash penetration with no apsiration and mild stasis. Thick tsp, Puree, Fruit and Cookie: no penetration and no aspiration with mild vallec/pyriform stasis.     Dysphagia Outcome Severity Scale: Level 6: Within functional limits/Modified independence  Penetration-Aspiration Scale (PAS): 2 - Material enters the airway, remains above the vocal folds, and is ejected from the airway    Esophageal Phase  Esophageal Screen: [facility]        Pain   Patient Currently in Pain: No  Pain Level: 0    G-Code:  SLP G-Codes  Functional Limitations: Swallowing  Swallow Current Status (): At least 1 percent but less than 20 percent impaired, limited or restricted  Swallow Goal Status (): 0 percent impaired, limited or restricted  Other Speech-Language Pathology Functional Limitation ():  At least 1 percent but less than 20 percent impaired, limited or restricted  Other Speech-Language Pathology Functional Limitation Goal Status (): 0 percent impaired, limited or restricted      Therapy Time:   Individual Concurrent Group Co-treatment   Time In 1130         Time Out 1145         Minutes 15                   Viktoria Bright, 9/27/2018, 1:20 PM

## 2018-09-27 NOTE — PROGRESS NOTES
Physical Therapy    Facility/Department: [address]  Initial Assessment    NAME: Donis Engle  : 1945  MRN: [id number]    Chief Complaint   Patient presents with    Cerebrovascular Accident     Date of Service: 2018    Discharge Recommendations:  Home with assist PRN with use of SPC, Outpatient PT (for dynamic balance and gait unsteadiness)  PT Equipment Recommendations  Equipment Needed: No (Pt states she has [facility])    Patient Diagnosis(es): The encounter diagnosis was Ischemic cerebrovascular accident (CVA) ([address] has a past medical history of Cataract; Headache(784.0); Hypertension; Reflux; and Unspecified cerebral artery occlusion with cerebral infarction. has a past surgical history that includes Splenectomy, total.    Restrictions  Restrictions/Precautions  Restrictions/Precautions: Fall Risk  Required Braces or Orthoses?: No  Vision/Hearing  Vision: Impaired  Vision Exceptions: Wears glasses at all times  Hearing: Within functional limits     Subjective  General  Patient assessed for rehabilitation services?: Yes  Response To Previous Treatment: Not applicable  Family / Caregiver Present: No  Follows Commands: Within Functional Limits  Subjective  Subjective: Pt and RN agreeable to treatment this date.  Pt resting in bed upon arrival.   Pain Screening  Patient Currently in Pain: No  Vital Signs  Patient Currently in Pain: Denies       Orientation  Orientation  Overall Orientation Status: Within Normal Limits    Social/Functional History  Social/Functional History  Lives With: Alone  Type of Home: Apartment  Home Layout: One level  Home Access: Level entry  Bathroom Shower/Tub: Tub/Shower unit  Bathroom Toilet: Standard  Bathroom Equipment: Grab bars in shower, Grab bars around toilet  Bathroom Accessibility: Accessible  Home Equipment: Isac Mccallum Help From: Family, Friend(s)  ADL Assistance: Independent  Homemaking Assistance: Independent  Homemaking Responsibilities: Yes  Ambulation educated on temporary need for cane  Barriers to Learning: None  REQUIRES PT FOLLOW UP: Yes  Activity Tolerance  Activity Tolerance: Patient Tolerated treatment well     Plan   Plan  Times per week: 4-5  Times per day: Daily  Current Treatment Recommendations: Strengthening, Balance Training, Functional Mobility Training, Transfer Training, Endurance Training, Gait Training, Stair training, ADL/Self-care Training  Safety Devices  Type of devices: Left in bed, Nurse notified, Gait belt, Call light within reach, Patient at risk for falls  Restraints  Initially in place: No    G-Code  PT G-Codes  Functional Assessment Tool Used: [facility]  Score: 20  Functional Limitation: Mobility: Walking and moving around  Mobility: Walking and Moving Around Current Status (): At least 20 percent but less than 40 percent impaired, limited or restricted  Mobility: Walking and Moving Around Goal Status ():  At least 1 percent but less than 20 percent impaired, limited or restricted    AM-PAC Score  AM-PAC Inpatient Mobility Raw Score : 20  AM-PAC Inpatient T-Scale Score : 47.67  Mobility Inpatient CMS 0-100% Score: 35.83  Mobility Inpatient CMS G-Code Modifier : CJ        Goals  Short term goals  Time Frame for Short term goals: 14 visits  Short term goal 1: Pt will ambulate 250ft with least restrictive device independentlu  Short term goal 2: Pt will tolerate 30 minutes of exercise  Short term goal 3: Pt will be independent with transfers  Short term goal 4: Pt will be independent bed mobility  Short term goal 5: Pt will demonstrate good standing balance   Patient Goals   Patient goals : Pt wants to return home       Therapy Time   Individual Concurrent Group Co-treatment   Time In 0822         Time Out 0844         Minutes 22         Timed Code Treatment Minutes: 8 Minutes       EUNICE Juarez  Evaluation/treatment performed by Student PT under the supervision of co-signing PT who agrees with all evaluation/treatment and documentation.

## 2018-09-27 NOTE — PROGRESS NOTES
injection 12.5 mg, 12.5 mg, Intramuscular, Q6H PRN  pantoprazole (PROTONIX) tablet 40 mg, 40 mg, Oral, QAM AC  trospium (SANCTURA) tablet 20 mg, 20 mg, Oral, BID AC  simvastatin (ZOCOR) tablet 20 mg, 20 mg, Oral, Nightly  sodium chloride flush 0.9 % injection 10 mL, 10 mL, Intravenous, 2 times per day  sodium chloride flush 0.9 % injection 10 mL, 10 mL, Intravenous, PRN  ondansetron (ZOFRAN) injection 4 mg, 4 mg, Intravenous, Q6H PRN  0.9 % sodium chloride infusion, , Intravenous, Continuous  labetalol (NORMODYNE;TRANDATE) injection 10 mg, 10 mg, Intravenous, Q1H PRN    REVIEW OF SYSTEMS     CONSTITUTIONAL: negative for fatigue and malaise   EYES: negative for double vision and photophobia    HEENT: negative for tinnitus and sore throat   RESPIRATORY: negative for cough, shortness of breath   CARDIOVASCULAR: negative for chest pain, palpitations, or syncope   GASTROINTESTINAL: negative for abdominal pain, nausea, vomiting, diarrhea, or constipation    GENITOURINARY: negative for incontinence or retention    MUSCULOSKELETAL: negative for neck or back pain, negative for extremity pain   NEUROLOGICAL: Positive expressive aphasia. Negative for seizures, headaches, weakness, numbness, confusion, dysarthria. PSYCHIATRIC: negative for agitation, hallucination, SI/HI   SKIN Negative for spontaneous contusions, rashes, or lesions      ROS limited due to severe expressive aphasia (mute), but patient able to nod yes/no appropriately. PHYSICAL EXAM:     /62   Pulse 61   Temp 98.1 °F (36.7 °C) (Oral)   Resp 12   Ht 4' 11\" (1.499 m)   Wt 114 lb (51.7 kg)   SpO2 98%   BMI 23.03 kg/m²     Estimated body mass index is 23.03 kg/m² as calculated from the following:    Height as of this encounter: 4' 11\" (1.499 m).     Weight as of this encounter: 114 lb (51.7 kg).  []<16 Severe malnutrition  []1616.99 Moderate malnutrition  []1718.49 Mild malnutrition  [x]18.524.9 Normal  []2529.9 Overweight (not Problem List   Diagnosis    Ischemic cerebrovascular [facility] ([address] [address]    Expressive aphasia       ASSESSMENT:     This is a 74 y.o. female with a history of HTN and remote ICH (2012 with negative angio at that time) admitted to the Neuro ICU for management of acute onset expressive aphasia s/p TPA administration. Patient care will be discussed with attending, will reevaluate patient along with attending.      PLAN/MEDICAL DECISION MAKING:    NEUROLOGIC:  - Acute onset severe expressive aphasia  - CT Head negative at outlying hospital  - TPA administered 9/26/18 at 1403  - Post TPA precautions  - CTA Head/Neck with no high grade stenosis or focal occlusion, old hypodensity right frontal parietal lobe and left cerebellum  - CT Brain Perfusion with apparent perfusion abnormality within right parieto-occipital lobe felt to be artifactual  - Case discussed with Eli; no angio/thrombectomy at this time  - Neuro Endovascular following; continue low dose heparin infusion without bolus  - Increase statin to high dose, atorvastatin 40 mg  Daily   - Neuro checks per protocol     CARDIOVASCULAR:  - Goal -160  - Hold home antihypertensives for now  - PRN Labetalol  - Echocardiogram done , f/u report   - Continue telemetry monitoring     PULMONARY:  - Maintaining O2 sats on room air  - CXR with no acute findings  - Monitor respiratory status    RENAL/FLUID/ELECTROLYTE:  - BUN 10/Creatinine 0.49  - Monitor I&O  - IVF: NS @125ml/hr per Eli  - Hypokalemia, K 3.3, replace with 40meq KCl  - Replace electrolytes PRN  - Daily BMP    GI/NUTRITION:  Start on regular diet , passed video swallow  - GI Prophylaxis: Continue home Omeprazole  - Bowel Regimen: Milk of Magnesia PRN    ID/HEME:  - Monitor for fevers  - PRN Tylenol  - No leukocytosis,   - H&H 15.4/47.8  - Platelets 574    ENDOCRINE:  - Monitor blood glucose, goal <180  - F/U Hemoglobin A1C    OTHER:  - PT/OT/SLP eval     DVT PROPHYLAXIS:  - SCD sleeves - Thigh

## 2018-09-27 NOTE — PROGRESS NOTES
Speech Language Pathology  Facility/Department: [facility] 5B NSU  Initial Speech/Language/Cognitive Assessment    NAME: Percy Rivera  : 1945   MRN: [id number]  ADMISSION DATE: 2018  ADMITTING DIAGNOSIS: has Ischemic cerebrovascular accident (CVA) ([address] and Expressive aphasia on her problem list.    Date of Eval: 2018   Evaluating Therapist: Palak Alves    Primary Complaint: Ti Corcoran is a 74 y. o. female with a history of HTN and remote ICH ( with negative angio at that time) who presents as a transfer from [place] [id number] via [address] s/p tPA administration for severe expressive aphasia. Garry Lute reportedly 3 at [address] reportedly negative.  TPA administration started at [phone] in consultation with Dr. Vasquez Espinosa arrival to the ED, NIH 5 for severe expressive aphasia; patient is mute.  She follows commands and nods appropriately.  Moves all extremities purposefully and equally without drift.  Denies vision changes, numbness/tingling, headache. Pain:  Pain Assessment  Patient Currently in Pain: No  Pain Assessment: 0-10  Pain Level: 0    Assessment:    Pt presents with mild to moderate cognitive/linguistic deficits characterized by impaired word finding and verbal reasoning. Increased processing time noted. No dysarthria or facial weakness noted. ST to follow up and provide treatment to address noted deficits. Education provided. ST recommends inpatient rehab at this time. Recommendations:  Requires SLP Intervention: Yes  Duration/Frequency of Treatment: 3-5x week  D/C Recommendations: Inpatient rehabilitation       Plan:   Goals:  Short-term Goals  Goal 1: Pt will complete higher level word finding tasks with 90% accuracy. Goal 2: Pt will complete verbal reasoning tasks with 90% accuracy.    Patient/family involved in developing goals and treatment plan: yes    Subjective:  General  Chart Reviewed: Yes  Family / Caregiver Present: Yes (daughters were

## 2018-09-28 ENCOUNTER — APPOINTMENT (OUTPATIENT)
Dept: CT IMAGING | Age: 73
DRG: 042 | End: 2018-09-28
Payer: MEDICARE

## 2018-09-28 LAB
ABSOLUTE EOS #: <0.03 K/UL (ref 0–0.44)
ABSOLUTE IMMATURE GRANULOCYTE: 0.07 K/UL (ref 0–0.3)
ABSOLUTE LYMPH #: 2.33 K/UL (ref 1.1–3.7)
ABSOLUTE MONO #: 1.26 K/UL (ref 0.1–1.2)
ANION GAP SERPL CALCULATED.3IONS-SCNC: 12 MMOL/L (ref 9–17)
BASOPHILS # BLD: 1 % (ref 0–2)
BASOPHILS ABSOLUTE: 0.07 K/UL (ref 0–0.2)
BUN BLDV-MCNC: 16 MG/DL (ref 8–23)
BUN/CREAT BLD: ABNORMAL (ref 9–20)
CALCIUM IONIZED: 1.17 MMOL/L (ref 1.13–1.33)
CALCIUM SERPL-MCNC: 8.5 MG/DL (ref 8.6–10.4)
CHLORIDE BLD-SCNC: 110 MMOL/L (ref 98–107)
CO2: 24 MMOL/L (ref 20–31)
CREAT SERPL-MCNC: 0.39 MG/DL (ref 0.5–0.9)
DIFFERENTIAL TYPE: ABNORMAL
EKG ATRIAL RATE: 80 BPM
EKG P AXIS: 59 DEGREES
EKG P-R INTERVAL: 154 MS
EKG Q-T INTERVAL: 384 MS
EKG QRS DURATION: 88 MS
EKG QTC CALCULATION (BAZETT): 442 MS
EKG R AXIS: -46 DEGREES
EKG T AXIS: 3 DEGREES
EKG VENTRICULAR RATE: 80 BPM
EOSINOPHILS RELATIVE PERCENT: 0 % (ref 1–4)
GFR AFRICAN AMERICAN: >60 ML/MIN
GFR NON-AFRICAN AMERICAN: >60 ML/MIN
GFR SERPL CREATININE-BSD FRML MDRD: ABNORMAL ML/MIN/{1.73_M2}
GFR SERPL CREATININE-BSD FRML MDRD: ABNORMAL ML/MIN/{1.73_M2}
GLUCOSE BLD-MCNC: 117 MG/DL (ref 70–99)
HCT VFR BLD CALC: 44 % (ref 36.3–47.1)
HEMOGLOBIN: 13.6 G/DL (ref 11.9–15.1)
HOMOCYSTEINE: 7.8 UMOL/L
IMMATURE GRANULOCYTES: 1 %
LYMPHOCYTES # BLD: 17 % (ref 24–43)
MAGNESIUM: 2.1 MG/DL (ref 1.6–2.6)
MCH RBC QN AUTO: 30.7 PG (ref 25.2–33.5)
MCHC RBC AUTO-ENTMCNC: 30.9 G/DL (ref 28.4–34.8)
MCV RBC AUTO: 99.3 FL (ref 82.6–102.9)
MONOCYTES # BLD: 9 % (ref 3–12)
NRBC AUTOMATED: 0 PER 100 WBC
PARTIAL THROMBOPLASTIN TIME: 117.4 SEC (ref 20.5–30.5)
PARTIAL THROMBOPLASTIN TIME: 40 SEC (ref 20.5–30.5)
PARTIAL THROMBOPLASTIN TIME: 51.3 SEC (ref 20.5–30.5)
PDW BLD-RTO: 13.3 % (ref 11.8–14.4)
PHOSPHORUS: 1.8 MG/DL (ref 2.6–4.5)
PLATELET # BLD: 154 K/UL (ref 138–453)
PLATELET ESTIMATE: ABNORMAL
PMV BLD AUTO: 13 FL (ref 8.1–13.5)
POTASSIUM SERPL-SCNC: 3.3 MMOL/L (ref 3.7–5.3)
RBC # BLD: 4.43 M/UL (ref 3.95–5.11)
RBC # BLD: ABNORMAL 10*6/UL
SEG NEUTROPHILS: 72 % (ref 36–65)
SEGMENTED NEUTROPHILS ABSOLUTE COUNT: 9.8 K/UL (ref 1.5–8.1)
SODIUM BLD-SCNC: 146 MMOL/L (ref 135–144)
WBC # BLD: 13.6 K/UL (ref 3.5–11.3)
WBC # BLD: ABNORMAL 10*3/UL

## 2018-09-28 PROCEDURE — 99233 SBSQ HOSP IP/OBS HIGH 50: CPT | Performed by: PSYCHIATRY & NEUROLOGY

## 2018-09-28 PROCEDURE — 86147 CARDIOLIPIN ANTIBODY EA IG: CPT

## 2018-09-28 PROCEDURE — 82330 ASSAY OF CALCIUM: CPT

## 2018-09-28 PROCEDURE — 83735 ASSAY OF MAGNESIUM: CPT

## 2018-09-28 PROCEDURE — 92507 TX SP LANG VOICE COMM INDIV: CPT

## 2018-09-28 PROCEDURE — 80048 BASIC METABOLIC PNL TOTAL CA: CPT

## 2018-09-28 PROCEDURE — 81240 F2 GENE: CPT

## 2018-09-28 PROCEDURE — 85610 PROTHROMBIN TIME: CPT

## 2018-09-28 PROCEDURE — 85306 CLOT INHIBIT PROT S FREE: CPT

## 2018-09-28 PROCEDURE — 85302 CLOT INHIBIT PROT C ANTIGEN: CPT

## 2018-09-28 PROCEDURE — 36415 COLL VENOUS BLD VENIPUNCTURE: CPT

## 2018-09-28 PROCEDURE — 86146 BETA-2 GLYCOPROTEIN ANTIBODY: CPT

## 2018-09-28 PROCEDURE — 85025 COMPLETE CBC W/AUTO DIFF WBC: CPT

## 2018-09-28 PROCEDURE — 85305 CLOT INHIBIT PROT S TOTAL: CPT

## 2018-09-28 PROCEDURE — 70450 CT HEAD/BRAIN W/O DYE: CPT

## 2018-09-28 PROCEDURE — 85301 ANTITHROMBIN III ANTIGEN: CPT

## 2018-09-28 PROCEDURE — 2060000000 HC ICU INTERMEDIATE R&B

## 2018-09-28 PROCEDURE — 84100 ASSAY OF PHOSPHORUS: CPT

## 2018-09-28 PROCEDURE — 85613 RUSSELL VIPER VENOM DILUTED: CPT

## 2018-09-28 PROCEDURE — 97530 THERAPEUTIC ACTIVITIES: CPT

## 2018-09-28 PROCEDURE — 6370000000 HC RX 637 (ALT 250 FOR IP): Performed by: STUDENT IN AN ORGANIZED HEALTH CARE EDUCATION/TRAINING PROGRAM

## 2018-09-28 PROCEDURE — 6370000000 HC RX 637 (ALT 250 FOR IP): Performed by: NURSE PRACTITIONER

## 2018-09-28 PROCEDURE — 97535 SELF CARE MNGMENT TRAINING: CPT

## 2018-09-28 PROCEDURE — 6360000002 HC RX W HCPCS: Performed by: PSYCHIATRY & NEUROLOGY

## 2018-09-28 PROCEDURE — 83090 ASSAY OF HOMOCYSTEINE: CPT

## 2018-09-28 PROCEDURE — 81291 MTHFR GENE: CPT

## 2018-09-28 PROCEDURE — 6370000000 HC RX 637 (ALT 250 FOR IP): Performed by: INTERNAL MEDICINE

## 2018-09-28 PROCEDURE — 85730 THROMBOPLASTIN TIME PARTIAL: CPT

## 2018-09-28 PROCEDURE — 81241 F5 GENE: CPT

## 2018-09-28 RX ORDER — CLOPIDOGREL BISULFATE 75 MG/1
75 TABLET ORAL DAILY
Status: DISCONTINUED | OUTPATIENT
Start: 2018-09-28 | End: 2018-09-28

## 2018-09-28 RX ORDER — HEPARIN SODIUM 10000 [USP'U]/100ML
12 INJECTION, SOLUTION INTRAVENOUS CONTINUOUS
Status: DISCONTINUED | OUTPATIENT
Start: 2018-09-28 | End: 2018-09-29

## 2018-09-28 RX ADMIN — TROSPIUM CHLORIDE 20 MG: 20 TABLET, FILM COATED ORAL at 17:05

## 2018-09-28 RX ADMIN — PANTOPRAZOLE SODIUM 40 MG: 40 TABLET, DELAYED RELEASE ORAL at 08:11

## 2018-09-28 RX ADMIN — Medication 12 UNITS/KG/HR: at 14:51

## 2018-09-28 RX ADMIN — TROSPIUM CHLORIDE 20 MG: 20 TABLET, FILM COATED ORAL at 08:11

## 2018-09-28 RX ADMIN — DESMOPRESSIN ACETATE 40 MG: 0.2 TABLET ORAL at 22:32

## 2018-09-28 RX ADMIN — Medication 8 UNITS/KG/HR: at 22:57

## 2018-09-28 RX ADMIN — CLOPIDOGREL 75 MG: 75 TABLET, FILM COATED ORAL at 14:08

## 2018-09-28 ASSESSMENT — PAIN SCALES - GENERAL
PAINLEVEL_OUTOF10: 0

## 2018-09-28 NOTE — PROGRESS NOTES
Physical Therapy  Facility/Department: [facility] NEURO  Daily Treatment Note  NAME: Yi Valencia  : 1945  MRN: [id number]    Date of Service: 2018    Discharge Recommendations:  Home with assist PRN   PT Equipment Recommendations  Equipment Needed: No    Patient Diagnosis(es): The encounter diagnosis was Ischemic cerebrovascular accident (CVA) ([address] has a past medical history of Cataract; Headache(784.0); Hypertension; Reflux; and Unspecified cerebral artery occlusion with cerebral infarction. has a past surgical history that includes Splenectomy, total.    Restrictions  Restrictions/Precautions  Restrictions/Precautions: General Precautions  Required Braces or Orthoses?: No  Subjective   General  Family / Caregiver Present: Yes (dtrs)  Subjective  Subjective: Pt resting in bed when therapy etnered, agreeable to ambulate. Requesting to use the restroom first.   Pain Screening  Patient Currently in Pain: Denies  Vital Signs  Patient Currently in Pain: Denies       Orientation  Orientation  Overall Orientation Status: Within Normal Limits  Objective   Bed mobility  Supine to Sit: Independent  Sit to Supine: Independent  Scooting: Independent  Transfers  Sit to Stand: Independent  Stand to sit: Independent  Ambulation  Ambulation?: Yes  Ambulation 1  Surface: level tile  Device: No Device  Assistance: Independent  Quality of Gait: Pt steady with no LOB noted  Distance: 250ft     Balance  Posture: Good  Sitting - Static: Good  Sitting - Dynamic: Good  Standing - Static: Good  Standing - Dynamic: Good                           Assessment   Body structures, Functions, Activity limitations: Decreased functional mobility ; Decreased balance  Assessment: Pt able to ambulate 250ft with no device and no LOB. Pt steady and requries no additional acute PT. WIll defer continued acute PT at this time.    Prognosis: Good  REQUIRES PT FOLLOW UP: No  Activity Tolerance  Activity Tolerance: Patient Tolerated treatment

## 2018-09-28 NOTE — PLAN OF CARE
Problem: Falls - Risk of:  Goal: Will remain free from falls  Will remain free from falls   Outcome: Ongoing  Pt remained free from falls during my shift. Will continue to monitor. Federico Garcia RN      Problem: COMMUNICATION IMPAIRMENT  Goal: Ability to express needs and understand communication  Outcome: Ongoing  Pt encouraged to express needs and feelings. Pt accurately able to describe needs and communicated effectively. Will continue to monitor.   Federico Garcia RN

## 2018-09-28 NOTE — PLAN OF CARE
Problem: Falls - Risk of:  Goal: Will remain free from falls  Will remain free from falls   Outcome: Ongoing  Steady on feet    Problem: HEMODYNAMIC STATUS  Goal: Patient has stable vital signs and fluid balance  Outcome: Ongoing  Vs stable    Problem: ACTIVITY INTOLERANCE/IMPAIRED MOBILITY  Goal: Mobility/activity is maintained at optimum level for patient  Outcome: Ongoing

## 2018-09-28 NOTE — PROGRESS NOTES
Speech Language Pathology  Speech Language Pathology  [facility]    Cognitive/Speech & Language Treatment Note    Date: 9/28/2018  Patients Name: Lo Singh  MRN: [id number]  Diagnosis:   Patient Active Problem List   Diagnosis Code    Ischemic cerebrovascular accident (CVA) ([facility] [address] I63.9    Expressive aphasia R47.01       Pain: 0/10    Cognitive Treatment    Treatment time: 10:17- 10:35      Subjective: [x] Alert [x] Cooperative     [] Confused     [] Agitated    [] Lethargic      Objective/Assessment:    Problem Solving/Reasoning: Category Members (Abstract): 9/15 increased to 15/15 with min verbal cues  Opposites: 6/10 increased to 10/10 with min to mod verbal/visual cues    Other: Word Generation (Rhyming): 6/10 increased to 9/10 with min verbal cues  Naming Words by Letter: 5/10 increased to 9/10 with min to mod verbal cues    Plan:  [x] Continue ST services    [] Discharge from ST:      Discharge recommendations: [x] Inpatient Rehab   [] [place]   [] Outpatient Therapy  [] Follow up at trauma clinic   [] Other:       Treatment completed by: Completed by Kahlil Balderas,  Clinician    Co-signed by Isamar Roberto M.S., CCC/SLP

## 2018-09-29 LAB
ABSOLUTE EOS #: 0.11 K/UL (ref 0–0.44)
ABSOLUTE IMMATURE GRANULOCYTE: 0.04 K/UL (ref 0–0.3)
ABSOLUTE LYMPH #: 2.28 K/UL (ref 1.1–3.7)
ABSOLUTE MONO #: 1.28 K/UL (ref 0.1–1.2)
ANION GAP SERPL CALCULATED.3IONS-SCNC: 12 MMOL/L (ref 9–17)
BASOPHILS # BLD: 1 % (ref 0–2)
BASOPHILS ABSOLUTE: 0.08 K/UL (ref 0–0.2)
BUN BLDV-MCNC: 16 MG/DL (ref 8–23)
BUN/CREAT BLD: ABNORMAL (ref 9–20)
CALCIUM IONIZED: 1.09 MMOL/L (ref 1.13–1.33)
CALCIUM SERPL-MCNC: 9.5 MG/DL (ref 8.6–10.4)
CHLORIDE BLD-SCNC: 106 MMOL/L (ref 98–107)
CO2: 27 MMOL/L (ref 20–31)
CREAT SERPL-MCNC: 0.48 MG/DL (ref 0.5–0.9)
DIFFERENTIAL TYPE: ABNORMAL
EOSINOPHILS RELATIVE PERCENT: 1 % (ref 1–4)
GFR AFRICAN AMERICAN: >60 ML/MIN
GFR NON-AFRICAN AMERICAN: >60 ML/MIN
GFR SERPL CREATININE-BSD FRML MDRD: ABNORMAL ML/MIN/{1.73_M2}
GFR SERPL CREATININE-BSD FRML MDRD: ABNORMAL ML/MIN/{1.73_M2}
GLUCOSE BLD-MCNC: 117 MG/DL (ref 70–99)
HCT VFR BLD CALC: 46.2 % (ref 36.3–47.1)
HEMOGLOBIN: 14.7 G/DL (ref 11.9–15.1)
IMMATURE GRANULOCYTES: 0 %
LYMPHOCYTES # BLD: 24 % (ref 24–43)
MAGNESIUM: 2 MG/DL (ref 1.6–2.6)
MCH RBC QN AUTO: 30.6 PG (ref 25.2–33.5)
MCHC RBC AUTO-ENTMCNC: 31.8 G/DL (ref 28.4–34.8)
MCV RBC AUTO: 96 FL (ref 82.6–102.9)
MONOCYTES # BLD: 13 % (ref 3–12)
NRBC AUTOMATED: 0 PER 100 WBC
PARTIAL THROMBOPLASTIN TIME: 25.7 SEC (ref 20.5–30.5)
PARTIAL THROMBOPLASTIN TIME: 30.2 SEC (ref 20.5–30.5)
PARTIAL THROMBOPLASTIN TIME: 36.1 SEC (ref 20.5–30.5)
PARTIAL THROMBOPLASTIN TIME: 38.4 SEC (ref 20.5–30.5)
PDW BLD-RTO: 13 % (ref 11.8–14.4)
PHOSPHORUS: 2.4 MG/DL (ref 2.6–4.5)
PLATELET # BLD: 152 K/UL (ref 138–453)
PLATELET ESTIMATE: ABNORMAL
PMV BLD AUTO: 13 FL (ref 8.1–13.5)
POTASSIUM SERPL-SCNC: 3.2 MMOL/L (ref 3.7–5.3)
RBC # BLD: 4.81 M/UL (ref 3.95–5.11)
RBC # BLD: ABNORMAL 10*6/UL
SEG NEUTROPHILS: 61 % (ref 36–65)
SEGMENTED NEUTROPHILS ABSOLUTE COUNT: 5.78 K/UL (ref 1.5–8.1)
SODIUM BLD-SCNC: 145 MMOL/L (ref 135–144)
WBC # BLD: 9.6 K/UL (ref 3.5–11.3)
WBC # BLD: ABNORMAL 10*3/UL

## 2018-09-29 PROCEDURE — 2580000003 HC RX 258: Performed by: STUDENT IN AN ORGANIZED HEALTH CARE EDUCATION/TRAINING PROGRAM

## 2018-09-29 PROCEDURE — 2060000000 HC ICU INTERMEDIATE R&B

## 2018-09-29 PROCEDURE — 83735 ASSAY OF MAGNESIUM: CPT

## 2018-09-29 PROCEDURE — 80048 BASIC METABOLIC PNL TOTAL CA: CPT

## 2018-09-29 PROCEDURE — 85730 THROMBOPLASTIN TIME PARTIAL: CPT

## 2018-09-29 PROCEDURE — 6370000000 HC RX 637 (ALT 250 FOR IP): Performed by: NURSE PRACTITIONER

## 2018-09-29 PROCEDURE — 85025 COMPLETE CBC W/AUTO DIFF WBC: CPT

## 2018-09-29 PROCEDURE — 99232 SBSQ HOSP IP/OBS MODERATE 35: CPT | Performed by: PSYCHIATRY & NEUROLOGY

## 2018-09-29 PROCEDURE — 84100 ASSAY OF PHOSPHORUS: CPT

## 2018-09-29 PROCEDURE — 36415 COLL VENOUS BLD VENIPUNCTURE: CPT

## 2018-09-29 PROCEDURE — 2580000003 HC RX 258: Performed by: NURSE PRACTITIONER

## 2018-09-29 PROCEDURE — 6370000000 HC RX 637 (ALT 250 FOR IP): Performed by: STUDENT IN AN ORGANIZED HEALTH CARE EDUCATION/TRAINING PROGRAM

## 2018-09-29 PROCEDURE — 82330 ASSAY OF CALCIUM: CPT

## 2018-09-29 PROCEDURE — 99233 SBSQ HOSP IP/OBS HIGH 50: CPT | Performed by: PSYCHIATRY & NEUROLOGY

## 2018-09-29 RX ADMIN — DESMOPRESSIN ACETATE 40 MG: 0.2 TABLET ORAL at 20:15

## 2018-09-29 RX ADMIN — TROSPIUM CHLORIDE 20 MG: 20 TABLET, FILM COATED ORAL at 10:11

## 2018-09-29 RX ADMIN — TROSPIUM CHLORIDE 20 MG: 20 TABLET, FILM COATED ORAL at 17:54

## 2018-09-29 RX ADMIN — Medication 10 ML: at 20:18

## 2018-09-29 RX ADMIN — APIXABAN 5 MG: 5 TABLET, FILM COATED ORAL at 20:14

## 2018-09-29 RX ADMIN — PANTOPRAZOLE SODIUM 40 MG: 40 TABLET, DELAYED RELEASE ORAL at 10:09

## 2018-09-29 NOTE — FLOWSHEET NOTE
Your answer to Question 1 - \"Do you have any symptoms that are bothering you? \" = YES    Your answer to Question 2 - \"Are you able to do the same work as before? \" = YES    Your answer to Question 3 - \"Are you able to keep up with your hobbies? \" = YES    Your answer to Question 4 - \"Have you maintained your ties to friends and family? \" = YES    Your answer to Question 5 - \"Do you need help making a simple meal, doing household chores, or balancing a checkbook? \" = NO    Your answer to Question 6 - \"Do you need help with shopping or traveling close to home? \" = NO    Your answer to Question 7 - \"Do you need another person to help you walk? \" = NO    Your answer to Question 8 - \"Do you need help with eating, going to the toilet, or bathing? \" = NO    Your answer to Question 9 - \"Do you stay in bed most of the day and need constant nursing care? \" = NO      The modified Anthony Scale (mRS) is: <b=\"\"1     Potential conflicts are listed here (use the BACK button on your browser to fix errors):  No conflicts identified. Levels of the modified Anthony Scale (mRS):   0 - No symptoms. 1 - No significant disability. Able to carry out all usual activities, despite some symptoms. 2 - Slight disability. Able to look after own affairs without assistance, but unable to carry out all previous activities. 3 - Moderate disability. Requires some help, but able to walk unassisted. 4 - Moderately severe disability. Unable to attend to own bodily needs without assistance or unable to walk unassisted. 5 - Severe disability. Requires constant nursing care and attention, bedridden.

## 2018-09-29 NOTE — PROGRESS NOTES
NEUROLOGY INPATIENT PROGRESS NOTE    9/29/2018         Subjective: Donis Engle is a  74 y.o. female admitted on 9/26/2018 with Acute CVA (cerebrovascular accident) [facility]) [I63.9]  Expressive aphasia [R47.01]      Briefly, this is a  74 y.o. female admitted on 9/26/2018 with s/p t-PA and hx of htn and R-ICH in 2012. This is follow-up progress note. Patient is seen and evaluated at bedside ,no acute overnight events are noted. Patient is off heparin drip, started on Eliquis. Cardiology is consulted for KWESI and loop recorder placement. As per cardiology Eliquis dose to be held on Sunday night and monday morning for KWESI. Will continue to monitor the patient in meanwhile                  No current facility-administered medications on file prior to encounter. Current Outpatient Prescriptions on File Prior to Encounter   Medication Sig Dispense Refill    lisinopril (PRINIVIL;ZESTRIL) 10 MG tablet Take 10 mg by mouth 2 times daily.  amLODIPine (NORVASC) 10 MG tablet Take 10 mg by mouth daily.  omeprazole (PRILOSEC) 20 MG capsule Take 20 mg by mouth daily. Allergies: Braxton Hannah Corcoran has No Known Allergies.     Past Medical History:   Diagnosis Date    Cataract     Headache(784.0)     Hypertension     Reflux     Unspecified cerebral artery occlusion with cerebral infarction        Past Surgical History:   Procedure Laterality Date    SPLENECTOMY, TOTAL         ROS:   Constitutional Negative for fever and chills   HEENT Negative for ear discharge, ear pain, nosebleed   Eyes Negative for photophobia, pain and discharge   Respiratory Negative for hemoptysis and sputum   Cardiovascular Negative for orthopnea, claudication and PND   Gastrointestinal Negative for abdominal pain, diarrhea, blood in stool   Musculoskeletal Negative for joint pain, negative for myalgia   Skin Negative for rash or itching   Endo/heme/allergies Negative for polydipsia, environmental allergy   Psychiatric Negative for suicidal ideation. Patient is not anxious         Medications:     atorvastatin  40 mg Oral Nightly    sodium chloride flush  10 mL Intravenous 2 times per day    pantoprazole  40 mg Oral QAM AC    trospium  20 mg Oral BID AC    sodium chloride flush  10 mL Intravenous 2 times per day     PRN Meds include: sodium chloride flush, acetaminophen, fentanNYL **OR** fentanNYL, promethazine, sodium chloride flush, ondansetron, labetalol    Objective:   BP (!) 153/89   Pulse 68   Temp 98.1 °F (36.7 °C) (Oral)   Resp 16   Ht 4' 11\" (1.499 m)   Wt 114 lb (51.7 kg)   SpO2 96%   BMI 23.03 kg/m²     Blood pressure range: Systolic (08PYV), GZT:784 , Min:127 , YHT:987   ; Diastolic (69NUQ), MCM:94, Min:60, Max:89      ROS      EXAMINATION:  GENERAL    SKIN  Appears comfortable and in no distress    No gross lesions, rashes, bruising or bleeding on exposed skin area   HEENT NC/ AT  Eyes:no icterus, redness, pupils equal and reactive, extraocular eye movements intact, conjunctiva clear  Ear: normal external ear, no discharge, hearing intact  Nose:  no drainage noted  Mouth: mucous membranes moist   NECK  LUNGS  Supple and no bruits heard  Clear to auscultation,b/l   Cardiovascular  Abdomen  S1, S2 heard; radial pulse intact,RRR  Soft,non-tender,no organomegaly,BS+   MENTAL STATUS:  Alert, oriented, intact memory, no confusion, normal speech, normal language, no hallucination or delusion   CRANIAL NERVES: II     -       Pupils reactive b/l visual acuity: 20/30 OU; Fundus exam: intact venous pulsations;  Visual fields intact to confrontation  III,IV,VI -  EOMs full, no afferent defect, no                      POLLO, no ptosis  V     -     Normal facial sensation  VII    -     Normal facial symmetry  VIII   -     Intact hearing  IX,X -     Symmetrical palate  XI    -     Symmetrical shoulder shrug  XII   -     Midline tongue, no atrophy    MOTOR FUNCTION:  Bulk R side:Normal            L side:Normal  Tone  R side:Normal

## 2018-09-29 NOTE — PROGRESS NOTES
WBC 9.6 09/29/2018    RBC 4.81 09/29/2018    HGB 14.7 09/29/2018    HCT 46.2 09/29/2018    MCV 96.0 09/29/2018    RDW 13.0 09/29/2018     09/29/2018     BMP:    Lab Results   Component Value Date     09/29/2018    K 3.2 09/29/2018     09/29/2018    CO2 27 09/29/2018    BUN 16 09/29/2018    CREATININE 0.48 09/29/2018    CALCIUM 9.5 09/29/2018    GFRAA >60 09/29/2018    LABGLOM >60 09/29/2018    GLUCOSE 117 09/29/2018       ICU guidelines/prophylaxis active for the following:    head above bed above 30 degrees    ASSESSMENT:  Anastacia Corcoran is a 74 y. o. female with PMH of HTN and right frontotemporal ICH in 2012 presented with acute expressive aphasia for which she received t-PA. CTA head and neck ruled out clear LVO for which no mechanical thrombectomy was indicated. PLAN:  MRI brain: Small foci of acute cortical infarct left inferior frontal gyrus. Etiology of the stroke is mostly embolic  Pending KWESI   Loop recorder  Heparin drip to be transitioned to oral anticoagulation. Planned discussed with neurology attending. Continue lipitor  No further neuro endovascular recommendations. please do not hesitate to contact us for any further questions or concerns.      Plan discussed with Dipti Beck MD  PGY8-Neuroendovascular Fellow  Stroke, [facility] Stroke [address]

## 2018-09-30 LAB
ABSOLUTE EOS #: 0.17 K/UL (ref 0–0.4)
ABSOLUTE IMMATURE GRANULOCYTE: 0 K/UL (ref 0–0.3)
ABSOLUTE LYMPH #: 1.39 K/UL (ref 1–4.8)
ABSOLUTE MONO #: 0.7 K/UL (ref 0.1–0.8)
ANION GAP SERPL CALCULATED.3IONS-SCNC: 13 MMOL/L (ref 9–17)
BASOPHILS # BLD: 1 % (ref 0–2)
BASOPHILS ABSOLUTE: 0.17 K/UL (ref 0–0.2)
BUN BLDV-MCNC: 19 MG/DL (ref 8–23)
BUN/CREAT BLD: ABNORMAL (ref 9–20)
CALCIUM IONIZED: 1.11 MMOL/L (ref 1.13–1.33)
CALCIUM SERPL-MCNC: 9.1 MG/DL (ref 8.6–10.4)
CHLORIDE BLD-SCNC: 102 MMOL/L (ref 98–107)
CO2: 27 MMOL/L (ref 20–31)
CREAT SERPL-MCNC: 0.56 MG/DL (ref 0.5–0.9)
DIFFERENTIAL TYPE: ABNORMAL
EOSINOPHILS RELATIVE PERCENT: 1 % (ref 1–4)
GFR AFRICAN AMERICAN: >60 ML/MIN
GFR NON-AFRICAN AMERICAN: >60 ML/MIN
GFR SERPL CREATININE-BSD FRML MDRD: ABNORMAL ML/MIN/{1.73_M2}
GFR SERPL CREATININE-BSD FRML MDRD: ABNORMAL ML/MIN/{1.73_M2}
GLUCOSE BLD-MCNC: 120 MG/DL (ref 70–99)
HCT VFR BLD CALC: 47.4 % (ref 36.3–47.1)
HEMOGLOBIN: 15 G/DL (ref 11.9–15.1)
IMMATURE GRANULOCYTES: 0 %
LYMPHOCYTES # BLD: 8 % (ref 24–44)
MAGNESIUM: 1.9 MG/DL (ref 1.6–2.6)
MCH RBC QN AUTO: 30.5 PG (ref 25.2–33.5)
MCHC RBC AUTO-ENTMCNC: 31.6 G/DL (ref 28.4–34.8)
MCV RBC AUTO: 96.5 FL (ref 82.6–102.9)
MONOCYTES # BLD: 4 % (ref 1–7)
MORPHOLOGY: NORMAL
NRBC AUTOMATED: 0 PER 100 WBC
PARTIAL THROMBOPLASTIN TIME: 27.3 SEC (ref 20.5–30.5)
PARTIAL THROMBOPLASTIN TIME: 27.7 SEC (ref 20.5–30.5)
PARTIAL THROMBOPLASTIN TIME: 28.5 SEC (ref 20.5–30.5)
PDW BLD-RTO: 13.1 % (ref 11.8–14.4)
PHOSPHORUS: 3 MG/DL (ref 2.6–4.5)
PLATELET # BLD: 134 K/UL (ref 138–453)
PLATELET ESTIMATE: ABNORMAL
PMV BLD AUTO: 13 FL (ref 8.1–13.5)
POTASSIUM SERPL-SCNC: 3.1 MMOL/L (ref 3.7–5.3)
PROTEIN C ANTIGEN: 182 % (ref 63–153)
PROTEIN S ANTIGEN, TOTAL: 128 % (ref 63–126)
RBC # BLD: 4.91 M/UL (ref 3.95–5.11)
RBC # BLD: ABNORMAL 10*6/UL
SEG NEUTROPHILS: 86 % (ref 36–66)
SEGMENTED NEUTROPHILS ABSOLUTE COUNT: 14.97 K/UL (ref 1.8–7.7)
SODIUM BLD-SCNC: 142 MMOL/L (ref 135–144)
WBC # BLD: 17.4 K/UL (ref 3.5–11.3)
WBC # BLD: ABNORMAL 10*3/UL

## 2018-09-30 PROCEDURE — 84100 ASSAY OF PHOSPHORUS: CPT

## 2018-09-30 PROCEDURE — 2060000000 HC ICU INTERMEDIATE R&B

## 2018-09-30 PROCEDURE — 80048 BASIC METABOLIC PNL TOTAL CA: CPT

## 2018-09-30 PROCEDURE — 85730 THROMBOPLASTIN TIME PARTIAL: CPT

## 2018-09-30 PROCEDURE — 36415 COLL VENOUS BLD VENIPUNCTURE: CPT

## 2018-09-30 PROCEDURE — 6370000000 HC RX 637 (ALT 250 FOR IP): Performed by: STUDENT IN AN ORGANIZED HEALTH CARE EDUCATION/TRAINING PROGRAM

## 2018-09-30 PROCEDURE — 99232 SBSQ HOSP IP/OBS MODERATE 35: CPT | Performed by: PSYCHIATRY & NEUROLOGY

## 2018-09-30 PROCEDURE — 82330 ASSAY OF CALCIUM: CPT

## 2018-09-30 PROCEDURE — 6370000000 HC RX 637 (ALT 250 FOR IP): Performed by: NURSE PRACTITIONER

## 2018-09-30 PROCEDURE — 85025 COMPLETE CBC W/AUTO DIFF WBC: CPT

## 2018-09-30 PROCEDURE — 2580000003 HC RX 258: Performed by: STUDENT IN AN ORGANIZED HEALTH CARE EDUCATION/TRAINING PROGRAM

## 2018-09-30 PROCEDURE — 83735 ASSAY OF MAGNESIUM: CPT

## 2018-09-30 PROCEDURE — 2580000003 HC RX 258: Performed by: NURSE PRACTITIONER

## 2018-09-30 RX ADMIN — Medication 10 ML: at 09:51

## 2018-09-30 RX ADMIN — DESMOPRESSIN ACETATE 40 MG: 0.2 TABLET ORAL at 20:42

## 2018-09-30 RX ADMIN — APIXABAN 5 MG: 5 TABLET, FILM COATED ORAL at 09:49

## 2018-09-30 RX ADMIN — TROSPIUM CHLORIDE 20 MG: 20 TABLET, FILM COATED ORAL at 19:10

## 2018-09-30 RX ADMIN — TROSPIUM CHLORIDE 20 MG: 20 TABLET, FILM COATED ORAL at 06:24

## 2018-09-30 RX ADMIN — APIXABAN 5 MG: 5 TABLET, FILM COATED ORAL at 20:42

## 2018-09-30 RX ADMIN — Medication 10 ML: at 20:42

## 2018-09-30 RX ADMIN — PANTOPRAZOLE SODIUM 40 MG: 40 TABLET, DELAYED RELEASE ORAL at 06:24

## 2018-09-30 ASSESSMENT — PAIN SCALES - GENERAL
PAINLEVEL_OUTOF10: 0
PAINLEVEL_OUTOF10: 0

## 2018-09-30 NOTE — PLAN OF CARE
Problem: HEMODYNAMIC STATUS  Goal: Patient has stable vital signs and fluid balance  Outcome: Ongoing  Pt with stable vital signs, alert/oriented x3

## 2018-09-30 NOTE — CONSULTS
[facility]   Consult Note         Today's Date: 9/29/2018  Patient Name: Corey Kruger  Date of admission: 9/26/2018  2:42 PM  Patient's age: 74 y.o., 1945  Admission Dx: Acute CVA (cerebrovascular accident) ([facility] [I63.9]  Expressive aphasia [R47.01]    Reason for Consult:  Cardiac evaluation    Requesting Physician: Audelia Nichols MD    REASON FOR CONSULT:  KWESI and Loop recorder    History Obtained From:  Patient, chart, staff, records    HISTORY OF PRESENT ILLNESS:      The patient is a 74 y.o. female who is admitted to the hospital for     Corey Kruger is a 74 y.o. female with a history of HTN and remote ICH (2012 with negative angio at that time) who presents as a transfer from [facility] via [facility] s/p tPA administration for severe expressive aphasia. NIH reportedly 3 at outlying facility. CT Head reportedly negative. TPA administration started at 1403 in consultation with Dr. Mackenzie Crocker. On arrival to the ED, NIH 5 for severe expressive aphasia; patient is mute. She follows commands and nods appropriately. Moves all extremities purposefully and equally without drift. Denies vision changes, numbness/tingling, headache. Past Medical History:   has a past medical history of Cataract; Headache(784.0); Hypertension; Reflux; and Unspecified cerebral artery occlusion with cerebral infarction. Past Surgical History:   has a past surgical history that includes Splenectomy, total.     Home Medications:    Prior to Admission medications    Medication Sig Start Date End Date Taking? Authorizing Provider   hydrALAZINE (APRESOLINE) 25 MG tablet Take 12.5 mg by mouth daily   Yes Historical Provider, MD   pravastatin (PRAVACHOL) 40 MG tablet Take 40 mg by mouth daily   Yes Historical Provider, MD   aspirin 81 MG tablet Take 81 mg by mouth daily   Yes Historical Provider, MD   lisinopril (PRINIVIL;ZESTRIL) 10 MG tablet Take 10 mg by mouth 2 times daily.       Historical Provider, MD
admission order set for stroke admission:   [] [id number] - LOUIE Intercerebral Hemorrhage Admission   [] [id number] - LOUIE Sub Arachnoid Hemorrhage Admission   [] [id number] - LOUIE Ischemic Stroke TPA Treatment Focused   [x] [id number] - IP Ischemic Stroke ICU Post Alteplase (TPA) Admission    [] [id number] - GEN Ischemic Stroke Non-Thrombolytic Focussed      MRI head without contrast - limited stroke evaluation  Load with 1.5g Keppra IVPB x1 due to seizure risk secondary to previous CVA  0.9% NS - 1000 ml bolus post IV contrast   0.9% NS infusion at 100 ml/hour  Recommend SBP <180  Post TPA precautions  Statin therapy  2D cardiac echo  Physical Therapy  Occupational Therapy  Speech Therapy with swallow evaluation  Fasting Lipid panel  Hgb A1c  Ok for primary team to start anticoagulation as appropriate for DVT prophylaxis  NIHSS assessment every shift / change in caregiver.      Discussed with CAMILLE Hernandez - CNP  Neuro Critical Care  Pager [address] [place] [address]

## 2018-09-30 NOTE — PROGRESS NOTES
NEUROLOGY INPATIENT PROGRESS NOTE    9/30/2018         Subjective: Diaz Warner is a  74 y.o. female admitted on 9/26/2018 with Acute CVA (cerebrovascular accident) [facility]) [I63.9]  Expressive aphasia [R47.01]    Briefly, this is a  74 y.o. female admitted on 9/26/2018 with past medical history of HTN and R-ICH in 2012 who presented on 9/26/18 as a transfer from [facility] s/p tPA due to aphasia. CT head : no acute ICH. tpA given 1403 on 9/26/18. NIH at OSH was 5. Patient was admitted to the NeuroICU for post tpa management. No large vessel occlusion on CTA head and Neck. CTP : perfusion abnormality within right parietoccipital lobe. MRI brain: small foci of acute cortical infract in the left inferior frontal gyrus. Patient started on low intensity  heparin drip for suspected embolic source. Most recent NIH today is 2. Patient stable for downgrade to NeuroStep Down Unit (9/28/18)    Today (9/30/18) patient was evaluated at bedside and found AAOx3, NAD, afebrile. Patietn refers no acute events during the night and feeling better each day. Able to walk, got to bathroom, eat. She refers family members say she is getting better in speech. Educated about tomorrow having KWESI    No current facility-administered medications on file prior to encounter. Current Outpatient Prescriptions on File Prior to Encounter   Medication Sig Dispense Refill    lisinopril (PRINIVIL;ZESTRIL) 10 MG tablet Take 10 mg by mouth 2 times daily.  amLODIPine (NORVASC) 10 MG tablet Take 10 mg by mouth daily.  omeprazole (PRILOSEC) 20 MG capsule Take 20 mg by mouth daily. Allergies: Nicolas Corcoran has No Known Allergies.     Past Medical History:   Diagnosis Date    Cataract     Headache(784.0)     Hypertension     Reflux     Unspecified cerebral artery occlusion with cerebral infarction      Past Surgical History:   Procedure Laterality Date    SPLENECTOMY, TOTAL       Medications:     apixaban  5 mg Oral BID   

## 2018-10-01 ENCOUNTER — APPOINTMENT (OUTPATIENT)
Dept: CARDIAC CATH/INVASIVE PROCEDURES | Age: 73
DRG: 042 | End: 2018-10-01
Payer: MEDICARE

## 2018-10-01 LAB
ABSOLUTE EOS #: 0.3 K/UL (ref 0–0.44)
ABSOLUTE IMMATURE GRANULOCYTE: 0.04 K/UL (ref 0–0.3)
ABSOLUTE LYMPH #: 2.41 K/UL (ref 1.1–3.7)
ABSOLUTE MONO #: 1.03 K/UL (ref 0.1–1.2)
ANION GAP SERPL CALCULATED.3IONS-SCNC: 15 MMOL/L (ref 9–17)
BASOPHILS # BLD: 0 % (ref 0–2)
BASOPHILS ABSOLUTE: 0.03 K/UL (ref 0–0.2)
BUN BLDV-MCNC: 22 MG/DL (ref 8–23)
BUN/CREAT BLD: ABNORMAL (ref 9–20)
CALCIUM IONIZED: 1.24 MMOL/L (ref 1.13–1.33)
CALCIUM SERPL-MCNC: 9.3 MG/DL (ref 8.6–10.4)
CHLORIDE BLD-SCNC: 104 MMOL/L (ref 98–107)
CO2: 25 MMOL/L (ref 20–31)
CREAT SERPL-MCNC: 0.53 MG/DL (ref 0.5–0.9)
DIFFERENTIAL TYPE: ABNORMAL
EOSINOPHILS RELATIVE PERCENT: 3 % (ref 1–4)
GFR AFRICAN AMERICAN: >60 ML/MIN
GFR NON-AFRICAN AMERICAN: >60 ML/MIN
GFR SERPL CREATININE-BSD FRML MDRD: ABNORMAL ML/MIN/{1.73_M2}
GFR SERPL CREATININE-BSD FRML MDRD: ABNORMAL ML/MIN/{1.73_M2}
GLUCOSE BLD-MCNC: 112 MG/DL (ref 70–99)
HCT VFR BLD CALC: 44.9 % (ref 36.3–47.1)
HEMOGLOBIN: 14.5 G/DL (ref 11.9–15.1)
IMMATURE GRANULOCYTES: 0 %
LV EF: 53 %
LV EF: 55 %
LVEF MODALITY: NORMAL
LVEF MODALITY: NORMAL
LYMPHOCYTES # BLD: 23 % (ref 24–43)
MAGNESIUM: 2 MG/DL (ref 1.6–2.6)
MCH RBC QN AUTO: 30.7 PG (ref 25.2–33.5)
MCHC RBC AUTO-ENTMCNC: 32.3 G/DL (ref 28.4–34.8)
MCV RBC AUTO: 94.9 FL (ref 82.6–102.9)
MONOCYTES # BLD: 10 % (ref 3–12)
NRBC AUTOMATED: 0 PER 100 WBC
PARTIAL THROMBOPLASTIN TIME: 25.6 SEC (ref 20.5–30.5)
PARTIAL THROMBOPLASTIN TIME: 27.6 SEC (ref 20.5–30.5)
PARTIAL THROMBOPLASTIN TIME: 28.8 SEC (ref 20.5–30.5)
PDW BLD-RTO: 12.7 % (ref 11.8–14.4)
PHOSPHORUS: 3.4 MG/DL (ref 2.6–4.5)
PLATELET # BLD: 135 K/UL (ref 138–453)
PLATELET ESTIMATE: ABNORMAL
PMV BLD AUTO: 12.8 FL (ref 8.1–13.5)
POTASSIUM SERPL-SCNC: 3.3 MMOL/L (ref 3.7–5.3)
PROTEIN S ANTIGEN, FREE: 143 % (ref 55–123)
RBC # BLD: 4.73 M/UL (ref 3.95–5.11)
RBC # BLD: ABNORMAL 10*6/UL
SEG NEUTROPHILS: 63 % (ref 36–65)
SEGMENTED NEUTROPHILS ABSOLUTE COUNT: 6.51 K/UL (ref 1.5–8.1)
SODIUM BLD-SCNC: 144 MMOL/L (ref 135–144)
WBC # BLD: 10.3 K/UL (ref 3.5–11.3)
WBC # BLD: ABNORMAL 10*3/UL

## 2018-10-01 PROCEDURE — 93312 ECHO TRANSESOPHAGEAL: CPT

## 2018-10-01 PROCEDURE — 2060000000 HC ICU INTERMEDIATE R&B

## 2018-10-01 PROCEDURE — 82330 ASSAY OF CALCIUM: CPT

## 2018-10-01 PROCEDURE — 2580000003 HC RX 258: Performed by: NURSE PRACTITIONER

## 2018-10-01 PROCEDURE — 80048 BASIC METABOLIC PNL TOTAL CA: CPT

## 2018-10-01 PROCEDURE — 93325 DOPPLER ECHO COLOR FLOW MAPG: CPT

## 2018-10-01 PROCEDURE — 33282 HC IMPANTABLE LOOP RECORDER: CPT

## 2018-10-01 PROCEDURE — 0JH602Z INSERTION OF MONITORING DEVICE INTO CHEST SUBCUTANEOUS TISSUE AND FASCIA, OPEN APPROACH: ICD-10-PCS | Performed by: INTERNAL MEDICINE

## 2018-10-01 PROCEDURE — 85730 THROMBOPLASTIN TIME PARTIAL: CPT

## 2018-10-01 PROCEDURE — 6370000000 HC RX 637 (ALT 250 FOR IP): Performed by: STUDENT IN AN ORGANIZED HEALTH CARE EDUCATION/TRAINING PROGRAM

## 2018-10-01 PROCEDURE — 6360000002 HC RX W HCPCS: Performed by: STUDENT IN AN ORGANIZED HEALTH CARE EDUCATION/TRAINING PROGRAM

## 2018-10-01 PROCEDURE — 84100 ASSAY OF PHOSPHORUS: CPT

## 2018-10-01 PROCEDURE — 97127 HC SP THER IVNTJ W/FOCUS COG FUNCJ: CPT

## 2018-10-01 PROCEDURE — 36415 COLL VENOUS BLD VENIPUNCTURE: CPT

## 2018-10-01 PROCEDURE — 6370000000 HC RX 637 (ALT 250 FOR IP): Performed by: NURSE PRACTITIONER

## 2018-10-01 PROCEDURE — C1764 EVENT RECORDER, CARDIAC: HCPCS

## 2018-10-01 PROCEDURE — 92507 TX SP LANG VOICE COMM INDIV: CPT

## 2018-10-01 PROCEDURE — 99233 SBSQ HOSP IP/OBS HIGH 50: CPT | Performed by: PSYCHIATRY & NEUROLOGY

## 2018-10-01 PROCEDURE — 2709999900 HC NON-CHARGEABLE SUPPLY

## 2018-10-01 PROCEDURE — 83735 ASSAY OF MAGNESIUM: CPT

## 2018-10-01 PROCEDURE — 76937 US GUIDE VASCULAR ACCESS: CPT

## 2018-10-01 PROCEDURE — 85025 COMPLETE CBC W/AUTO DIFF WBC: CPT

## 2018-10-01 RX ORDER — POTASSIUM CHLORIDE 7.45 MG/ML
10 INJECTION INTRAVENOUS
Status: COMPLETED | OUTPATIENT
Start: 2018-10-01 | End: 2018-10-01

## 2018-10-01 RX ADMIN — APIXABAN 5 MG: 5 TABLET, FILM COATED ORAL at 20:16

## 2018-10-01 RX ADMIN — POTASSIUM CHLORIDE 10 MEQ: 10 INJECTION, SOLUTION INTRAVENOUS at 16:33

## 2018-10-01 RX ADMIN — TROSPIUM CHLORIDE 20 MG: 20 TABLET, FILM COATED ORAL at 17:28

## 2018-10-01 RX ADMIN — POTASSIUM CHLORIDE 10 MEQ: 10 INJECTION, SOLUTION INTRAVENOUS at 15:11

## 2018-10-01 RX ADMIN — POTASSIUM CHLORIDE 10 MEQ: 10 INJECTION, SOLUTION INTRAVENOUS at 16:52

## 2018-10-01 RX ADMIN — Medication 10 ML: at 07:51

## 2018-10-01 RX ADMIN — DESMOPRESSIN ACETATE 40 MG: 0.2 TABLET ORAL at 20:16

## 2018-10-01 RX ADMIN — POTASSIUM CHLORIDE 10 MEQ: 10 INJECTION, SOLUTION INTRAVENOUS at 17:31

## 2018-10-01 NOTE — PROGRESS NOTES
Clear to auscultation  Heart: Regular rate and rhythm, s1/s2 auscultated, no murmurs. SR  Abdomen: soft, non-tender, bowel sounds active  Extremities: no edema  Neurologic: not done        Assessment / Acute Cardiac Problems:   1. CVA, possibly ischemic  2. Expressive aphasia  3. Hypertension    Patient Active Problem List:     Ischemic cerebrovascular accident (CVA) ([facility] [address]     Expressive aphasia     Embolic stroke involving left middle cerebral artery ([facility] [address] [facility]      Plan of Treatment:   1. KWESI/ILR today. 2. Recommend restarting anti-hypertensive medications per neuro recommondations/SBP goals. Was on ACE, hydralazine, & CCB prior to admission.     Electronically signed by CAMILLE Quarles CNP on 10/1/2018 at 12:06 PM  [address]  [phone]

## 2018-10-01 NOTE — PROCEDURES
[facility]  KWESI procedure Note         Today's Date: 10/1/2018  Indication: Cryptogenic CVA, r/o cardiac embolic source     Operators:  Primary: Cintia Li MD(Attending)   Assistant: Christopher Moeller MD (Fellow)    Patient seen and examined. History and Physical reviewed. Labs reviewed. After informed consent was obtained with explanation of the risks and benefits, the patient was brought to EP lab. All sedation was administered by the cardiologist. The oropharynx was pre anesthetisized with cetacaine spray. KWESI:    Structures:  LA: Normal  PARISH: No thrombus  RA: Normal  RV:  Normal  LV: Normal in size with normal systolic function . Estimated LVEF: 55%  Aorta: Mild atheromatous disease arch  Percardium: No pericardial effusion  Septum: No intracardiac shunt via color Doppler. No intracardiac shunt via injection of agitated saline. Valves:  Mitral Valve: Structurally normal. Mild regurgitation is identified. Aortic Valve: The aortic valve is trileaflet and opens adequately. Trace regurgitiation is identified. Tricuspid valve: Structurally normal. No regurgitation is identified. Pulmonary valve: Normal. No significant regurgitation    No valvular vegetations or thrombus identified. Summary:     1. A KWESI was performed without complications. 2. Normal Lv size with normal systolic function. Estimated LVEF 55%  3. No PARISH thrombus or valvular vegetation identified  4.  No evidence of intracardiac shunting via color doppler or bubble study       Complications: None      Electronically signed by Michael Ro MD on 10/1/2018 at 2:50 PM  Fellow, [address], [place]

## 2018-10-01 NOTE — PROGRESS NOTES
language problems; no hallucinations or delusions     Cranial nerves   II - visual fields intact to confrontation                                                III, IV, VI  extra-ocular muscles full: no pupillary defect; no POLLO, no nystagmus, no ptosis                                                                      V - normal facial sensation                                                               VII - normal facial symmetry                                                             VIII - intact hearing                                                                             IX, X - symmetrical palate                                                                  XI - symmetrical shoulder shrug                                                       XII - midline tongue without atrophy or fasciculation     Motor function  Normal muscle bulk and tone; normal power 5/5, including fine motor movements     Sensory function Intact to touch, pin, vibration, proprioception     Cerebellar Intact fine motor movement. No involuntary movements or tremors     Reflex function Intact 2+ DTR and symmetric with no pathologic reflex or Babinski sign     Gait                  Normal station and gait             DATA      Lab Results   Component Value Date    WBC 10.3 10/01/2018    HGB 14.5 10/01/2018    HCT 44.9 10/01/2018     (L) 10/01/2018    CHOL 199 09/27/2018    TRIG 39 09/27/2018     09/27/2018    ALT 15 06/10/2012    AST 29 06/10/2012     10/01/2018    K 3.3 (L) 10/01/2018     10/01/2018    CREATININE 0.53 10/01/2018    BUN 22 10/01/2018    CO2 25 10/01/2018    TSH 0.89 06/10/2012    INR 1.0 09/28/2018    LABA1C 5.7 09/27/2018       MRI showing \"Small foci of acute cortical infarct left inferior frontal gyrus. \"    TTE shows no evidence of thrombus, w/ KWESI pending      ASSESSMENT:  74 y.o.  female with:    1.  Improving aphasia s/p acute ischemic cortical infarcts of left frontal

## 2018-10-01 NOTE — PROGRESS NOTES
hallucinations or delusions     Cranial nerves   II - visual fields intact to confrontation                                                III, IV, VI  extra-ocular muscles full: no pupillary defect; no POLLO, no nystagmus, no ptosis                                                                      V - normal facial sensation                                                               VII - normal facial symmetry                                                             VIII - intact hearing                                                                             IX, X - symmetrical palate                                                                  XI - symmetrical shoulder shrug                                                       XII - midline tongue without atrophy or fasciculation     Motor function  Normal muscle bulk and tone; normal power 5/5, including fine motor movements     Sensory function Intact to touch, pin, vibration, proprioception     Cerebellar Intact fine motor movement. No involuntary movements or tremors     Reflex function Intact 2+ DTR and symmetric with no pathologic reflex or Babinski sign     Gait                  Normal station and gait             DATA      Lab Results   Component Value Date    WBC 10.3 10/01/2018    HGB 14.5 10/01/2018    HCT 44.9 10/01/2018     (L) 10/01/2018    CHOL 199 09/27/2018    TRIG 39 09/27/2018     09/27/2018    ALT 15 06/10/2012    AST 29 06/10/2012     10/01/2018    K 3.3 (L) 10/01/2018     10/01/2018    CREATININE 0.53 10/01/2018    BUN 22 10/01/2018    CO2 25 10/01/2018    TSH 0.89 06/10/2012    INR 1.0 09/28/2018    LABA1C 5.7 09/27/2018       MRI showing \"Small foci of acute cortical infarct left inferior frontal gyrus. \"    TTE shows no evidence of thrombus, w/ KWESI pending      ASSESSMENT:  74 y.o.  female with:    1. 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  History of right

## 2018-10-02 VITALS
BODY MASS INDEX: 22.98 KG/M2 | WEIGHT: 114 LBS | HEART RATE: 89 BPM | OXYGEN SATURATION: 98 % | SYSTOLIC BLOOD PRESSURE: 121 MMHG | DIASTOLIC BLOOD PRESSURE: 64 MMHG | HEIGHT: 59 IN | RESPIRATION RATE: 17 BRPM | TEMPERATURE: 98.3 F

## 2018-10-02 LAB
ABSOLUTE EOS #: 0.34 K/UL (ref 0–0.44)
ABSOLUTE IMMATURE GRANULOCYTE: 0.04 K/UL (ref 0–0.3)
ABSOLUTE LYMPH #: 1.62 K/UL (ref 1.1–3.7)
ABSOLUTE MONO #: 0.95 K/UL (ref 0.1–1.2)
ALBUMIN SERPL-MCNC: 4 G/DL (ref 3.5–5.2)
ALBUMIN/GLOBULIN RATIO: 1.7 (ref 1–2.5)
ALP BLD-CCNC: 56 U/L (ref 35–104)
ALT SERPL-CCNC: 35 U/L (ref 5–33)
ANION GAP SERPL CALCULATED.3IONS-SCNC: 11 MMOL/L (ref 9–17)
ANION GAP SERPL CALCULATED.3IONS-SCNC: 12 MMOL/L (ref 9–17)
ANTI B2-GLYCOPROTEIN IGG: 0 SGU (ref 0–20)
ANTI B2-GLYCOPROTEIN IGM: 1 SMU (ref 0–20)
ANTI-THROMBIN 3 AG: 108 % (ref 82–136)
ANTICARDIOLIPIN IGA ANTIBODY: 2.7 APU
ANTICARDIOLIPIN IGG ANTIBODY: 1.9 GPU
AST SERPL-CCNC: 29 U/L
BASOPHILS # BLD: 0 % (ref 0–2)
BASOPHILS ABSOLUTE: 0.03 K/UL (ref 0–0.2)
BILIRUB SERPL-MCNC: 0.42 MG/DL (ref 0.3–1.2)
BUN BLDV-MCNC: 23 MG/DL (ref 8–23)
BUN BLDV-MCNC: 25 MG/DL (ref 8–23)
BUN/CREAT BLD: ABNORMAL (ref 9–20)
BUN/CREAT BLD: ABNORMAL (ref 9–20)
CALCIUM IONIZED: 1.21 MMOL/L (ref 1.13–1.33)
CALCIUM SERPL-MCNC: 8.7 MG/DL (ref 8.6–10.4)
CALCIUM SERPL-MCNC: 9.3 MG/DL (ref 8.6–10.4)
CARDIOLIPIN AB IGM: 3.1 MPU
CHLORIDE BLD-SCNC: 107 MMOL/L (ref 98–107)
CHLORIDE BLD-SCNC: 107 MMOL/L (ref 98–107)
CO2: 22 MMOL/L (ref 20–31)
CO2: 26 MMOL/L (ref 20–31)
CREAT SERPL-MCNC: 0.51 MG/DL (ref 0.5–0.9)
CREAT SERPL-MCNC: 0.56 MG/DL (ref 0.5–0.9)
DIFFERENTIAL TYPE: ABNORMAL
DILUTE RUSSELL VIPER VENOM TIME: ABNORMAL
EOSINOPHILS RELATIVE PERCENT: 4 % (ref 1–4)
GFR AFRICAN AMERICAN: >60 ML/MIN
GFR AFRICAN AMERICAN: >60 ML/MIN
GFR NON-AFRICAN AMERICAN: >60 ML/MIN
GFR NON-AFRICAN AMERICAN: >60 ML/MIN
GFR SERPL CREATININE-BSD FRML MDRD: ABNORMAL ML/MIN/{1.73_M2}
GLUCOSE BLD-MCNC: 121 MG/DL (ref 70–99)
GLUCOSE BLD-MCNC: 170 MG/DL (ref 70–99)
HCT VFR BLD CALC: 41.8 % (ref 36.3–47.1)
HEMOGLOBIN: 13.6 G/DL (ref 11.9–15.1)
IMMATURE GRANULOCYTES: 0 %
INR BLD: 1
LUPUS ANTICOAG: ABNORMAL
LYMPHOCYTES # BLD: 18 % (ref 24–43)
MAGNESIUM: 2 MG/DL (ref 1.6–2.6)
MCH RBC QN AUTO: 30.8 PG (ref 25.2–33.5)
MCHC RBC AUTO-ENTMCNC: 32.5 G/DL (ref 28.4–34.8)
MCV RBC AUTO: 94.8 FL (ref 82.6–102.9)
MONOCYTES # BLD: 11 % (ref 3–12)
NRBC AUTOMATED: 0 PER 100 WBC
PARTIAL THROMBOPLASTIN TIME: 28 SEC (ref 20.5–30.5)
PARTIAL THROMBOPLASTIN TIME: 28 SEC (ref 20.5–30.5)
PARTIAL THROMBOPLASTIN TIME: 29.1 SEC (ref 20.5–30.5)
PARTIAL THROMBOPLASTIN TIME: 47.7 SEC (ref 20.5–30.5)
PDW BLD-RTO: 12.9 % (ref 11.8–14.4)
PHOSPHORUS: 3.4 MG/DL (ref 2.6–4.5)
PLATELET # BLD: ABNORMAL K/UL (ref 138–453)
PLATELET ESTIMATE: ABNORMAL
PLATELET, FLUORESCENCE: 145 K/UL (ref 138–453)
PLATELET, IMMATURE FRACTION: 24.5 % (ref 1.1–10.3)
PMV BLD AUTO: ABNORMAL FL (ref 8.1–13.5)
POTASSIUM SERPL-SCNC: 3.4 MMOL/L (ref 3.7–5.3)
POTASSIUM SERPL-SCNC: 4 MMOL/L (ref 3.7–5.3)
PROTHROMBIN TIME: 10.3 SEC (ref 9–12)
RBC # BLD: 4.41 M/UL (ref 3.95–5.11)
RBC # BLD: ABNORMAL 10*6/UL
SEG NEUTROPHILS: 67 % (ref 36–65)
SEGMENTED NEUTROPHILS ABSOLUTE COUNT: 5.91 K/UL (ref 1.5–8.1)
SODIUM BLD-SCNC: 141 MMOL/L (ref 135–144)
SODIUM BLD-SCNC: 144 MMOL/L (ref 135–144)
TOTAL PROTEIN: 6.4 G/DL (ref 6.4–8.3)
WBC # BLD: 8.9 K/UL (ref 3.5–11.3)
WBC # BLD: ABNORMAL 10*3/UL

## 2018-10-02 PROCEDURE — 80053 COMPREHEN METABOLIC PANEL: CPT

## 2018-10-02 PROCEDURE — 85730 THROMBOPLASTIN TIME PARTIAL: CPT

## 2018-10-02 PROCEDURE — 82330 ASSAY OF CALCIUM: CPT

## 2018-10-02 PROCEDURE — 80048 BASIC METABOLIC PNL TOTAL CA: CPT

## 2018-10-02 PROCEDURE — 6370000000 HC RX 637 (ALT 250 FOR IP): Performed by: STUDENT IN AN ORGANIZED HEALTH CARE EDUCATION/TRAINING PROGRAM

## 2018-10-02 PROCEDURE — 85055 RETICULATED PLATELET ASSAY: CPT

## 2018-10-02 PROCEDURE — 99239 HOSP IP/OBS DSCHRG MGMT >30: CPT | Performed by: PSYCHIATRY & NEUROLOGY

## 2018-10-02 PROCEDURE — 85025 COMPLETE CBC W/AUTO DIFF WBC: CPT

## 2018-10-02 PROCEDURE — 97127 HC SP THER IVNTJ W/FOCUS COG FUNCJ: CPT

## 2018-10-02 PROCEDURE — 84100 ASSAY OF PHOSPHORUS: CPT

## 2018-10-02 PROCEDURE — 83735 ASSAY OF MAGNESIUM: CPT

## 2018-10-02 PROCEDURE — 36415 COLL VENOUS BLD VENIPUNCTURE: CPT

## 2018-10-02 PROCEDURE — 2580000003 HC RX 258: Performed by: NURSE PRACTITIONER

## 2018-10-02 PROCEDURE — 6370000000 HC RX 637 (ALT 250 FOR IP): Performed by: NURSE PRACTITIONER

## 2018-10-02 RX ORDER — POTASSIUM CHLORIDE 20 MEQ/1
40 TABLET, EXTENDED RELEASE ORAL ONCE
Status: COMPLETED | OUTPATIENT
Start: 2018-10-02 | End: 2018-10-02

## 2018-10-02 RX ORDER — ATORVASTATIN CALCIUM 40 MG/1
40 TABLET, FILM COATED ORAL NIGHTLY
Qty: 30 TABLET | Refills: 3 | Status: SHIPPED | OUTPATIENT
Start: 2018-10-02 | End: 2018-10-03

## 2018-10-02 RX ADMIN — PANTOPRAZOLE SODIUM 40 MG: 40 TABLET, DELAYED RELEASE ORAL at 08:46

## 2018-10-02 RX ADMIN — Medication 10 ML: at 08:43

## 2018-10-02 RX ADMIN — DESMOPRESSIN ACETATE 40 MG: 0.2 TABLET ORAL at 18:20

## 2018-10-02 RX ADMIN — POTASSIUM CHLORIDE 40 MEQ: 1500 TABLET, EXTENDED RELEASE ORAL at 08:46

## 2018-10-02 RX ADMIN — APIXABAN 5 MG: 5 TABLET, FILM COATED ORAL at 08:46

## 2018-10-02 RX ADMIN — TROSPIUM CHLORIDE 20 MG: 20 TABLET, FILM COATED ORAL at 08:46

## 2018-10-02 RX ADMIN — APIXABAN 5 MG: 5 TABLET, FILM COATED ORAL at 18:19

## 2018-10-02 ASSESSMENT — PAIN SCALES - GENERAL: PAINLEVEL_OUTOF10: 0

## 2018-10-02 NOTE — PROGRESS NOTES
Neurology Resident Progress Note      SUBJECTIVE:  This is a 74 y.o.  female admitted 9/26/2018 for CVA with expressive aphasia s/p tPA on 9/26. Patient reports no complaints.  potassium chloride  40 mEq Oral Once    apixaban  5 mg Oral BID    atorvastatin  40 mg Oral Nightly    sodium chloride flush  10 mL Intravenous 2 times per day    pantoprazole  40 mg Oral QAM AC    trospium  20 mg Oral BID AC    sodium chloride flush  10 mL Intravenous 2 times per day       Past Medical History:   Diagnosis Date    Cataract     Headache(784.0)     Hypertension     Reflux     Unspecified cerebral artery occlusion with cerebral infarction        Past Surgical History:   Procedure Laterality Date    SPLENECTOMY, TOTAL         PHYSICAL EXAM:      Blood pressure (!) 144/72, pulse 60, temperature 98.6 °F (37 °C), temperature source Oral, resp. rate 11, height 4' 11\" (1.499 m), weight 114 lb (51.7 kg), SpO2 98 %.      General Examination    General Awake   Head Normocephalic, without obvious abnormality, atraumatic   Neck Supple, symmetrical, trachea midline, No mass   Lungs Respirations unlabored   Chest Wall No deformity   Heart Regular rate and rhythm   Abdomen No masses   Extremities Extremities normal, atraumatic, no cyanosis or edema     Pulses 2+ and symmetric   Skin: Skin color, texture, turgor normal, no rashes or lesions         Speech: Speaks softly with residual slurring and mild difficulty of articulation    Mental status   Alert and oriented; intact memory with no confusion, speech or language problems; no hallucinations or delusions     Cranial nerves   II - visual fields intact to confrontation                                                III, IV, VI  extra-ocular muscles full: no pupillary defect; no POLLO, no nystagmus, no ptosis                                                                      V - normal facial sensation                                                               VII - normal facial symmetry                                                             VIII - intact hearing                                                                             IX, X - symmetrical palate                                                                  XI - symmetrical shoulder shrug                                                       XII - midline tongue without atrophy or fasciculation     Motor function  Normal muscle bulk and tone; normal power 5/5, including fine motor movements     Sensory function Intact to touch, pin, vibration, proprioception     Cerebellar Intact fine motor movement. No involuntary movements or tremors     Reflex function Intact 2+ DTR and symmetric with no pathologic reflex or Babinski sign     Gait                  Normal station and gait             DATA      Lab Results   Component Value Date    WBC 8.9 10/02/2018    HGB 13.6 10/02/2018    HCT 41.8 10/02/2018    PLT See Reflexed IPF Result 10/02/2018    CHOL 199 09/27/2018    TRIG 39 09/27/2018     09/27/2018    ALT 15 06/10/2012    AST 29 06/10/2012     10/02/2018    K 3.4 (L) 10/02/2018     10/02/2018    CREATININE 0.51 10/02/2018    BUN 23 10/02/2018    CO2 26 10/02/2018    TSH 0.89 06/10/2012    INR 1.0 09/28/2018    LABA1C 5.7 09/27/2018       MRI showing \"Small foci of acute cortical infarct left inferior frontal gyrus. \"    ASSESSMENT:  74 y.o.  female with:    1. 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 History of right ICH in 2012         PLAN:    1. Continue Eliquis 5 mg po bid and Atorvastatin 40 mg po daily  2. KWESI  Neg, s/p loop recorder placement   3. Continue speech therapy, PT, OT  4.  Awaiting results of hypercoagulability studies  DISPO: discharge today to assisted living facility   Electronically signed by Roxanne Curran DO on 10/2/2018 at 8:12 AM   Attending Physician Statement:    I have discussed the care of Joey Jacobson

## 2018-10-02 NOTE — CARE COORDINATION
Spoke with patient and daughter. Grisel Leal, in regards to discharge today. Daughter and patient states that daughter will transport her home. Her home is independent, but has medical if needed. Patient is independent and sister lives in the same [place] on [place]. Will speak with ANGIE Euceda to proceed with discharge today.

## 2018-10-03 ENCOUNTER — TELEPHONE (OUTPATIENT)
Dept: NEUROSURGERY | Age: 73
End: 2018-10-03

## 2018-10-03 RX ORDER — ATORVASTATIN CALCIUM 40 MG/1
40 TABLET, FILM COATED ORAL DAILY
Qty: 30 TABLET | Refills: 3 | Status: SHIPPED | OUTPATIENT
Start: 2018-10-03

## 2018-10-05 LAB
FACTOR V LEIDEN MUTATION: NORMAL
MTHFR MUTATION 677T/A1298C: NORMAL
PROTHROMBIN G20210A MUTATION: NORMAL

## 2018-11-06 ENCOUNTER — OFFICE VISIT (OUTPATIENT)
Dept: NEUROLOGY | Age: 73
End: 2018-11-06
Payer: MEDICARE

## 2018-11-06 VITALS
BODY MASS INDEX: 23.03 KG/M2 | HEART RATE: 73 BPM | HEIGHT: 59 IN | DIASTOLIC BLOOD PRESSURE: 77 MMHG | SYSTOLIC BLOOD PRESSURE: 143 MMHG

## 2018-11-06 DIAGNOSIS — I63.9 CARDIOEMBOLIC STROKE (HCC): Primary | ICD-10-CM

## 2018-11-06 PROCEDURE — 99215 OFFICE O/P EST HI 40 MIN: CPT | Performed by: INTERNAL MEDICINE

## 2018-11-06 RX ORDER — HYDROCHLOROTHIAZIDE 25 MG/1
TABLET ORAL
COMMUNITY
Start: 2018-10-03

## 2018-11-06 NOTE — PROGRESS NOTES
Cardiology in next few days (loop recorder, Eliquis management)  2.) Continue Eliquis due to suspected cardioembolic source due to previous strokes  3.) Continue Statin and BP medications  4.) F/U in 3 months with General neurology (February 12, 2018)  5.) F/U with PCP for Blood Pressure control, LDL and other medical management    Ms. Corcoran received counseling on the following healthy behaviors: medical compliance, smoking cessation, blood pressure control, regular follow up with primary doctor.         Electronically signed by Mera Gutierres MD on 11/6/2018 at 2:39 PM

## 2019-02-12 ENCOUNTER — OFFICE VISIT (OUTPATIENT)
Dept: NEUROLOGY | Age: 74
End: 2019-02-12
Payer: MEDICARE

## 2019-02-12 VITALS
SYSTOLIC BLOOD PRESSURE: 144 MMHG | DIASTOLIC BLOOD PRESSURE: 77 MMHG | BODY MASS INDEX: 25.37 KG/M2 | WEIGHT: 125.6 LBS | HEART RATE: 67 BPM

## 2019-02-12 DIAGNOSIS — I63.9 CARDIOEMBOLIC STROKE (HCC): Primary | ICD-10-CM

## 2019-02-12 PROCEDURE — 99214 OFFICE O/P EST MOD 30 MIN: CPT | Performed by: INTERNAL MEDICINE

## 2019-02-12 PROCEDURE — 1036F TOBACCO NON-USER: CPT | Performed by: INTERNAL MEDICINE

## 2019-02-12 PROCEDURE — 1123F ACP DISCUSS/DSCN MKR DOCD: CPT | Performed by: INTERNAL MEDICINE

## 2019-02-12 PROCEDURE — G8482 FLU IMMUNIZE ORDER/ADMIN: HCPCS | Performed by: INTERNAL MEDICINE

## 2019-02-12 PROCEDURE — G8599 NO ASA/ANTIPLAT THER USE RNG: HCPCS | Performed by: INTERNAL MEDICINE

## 2019-02-12 PROCEDURE — 1090F PRES/ABSN URINE INCON ASSESS: CPT | Performed by: INTERNAL MEDICINE

## 2019-02-12 PROCEDURE — 4040F PNEUMOC VAC/ADMIN/RCVD: CPT | Performed by: INTERNAL MEDICINE

## 2019-02-12 PROCEDURE — G8419 CALC BMI OUT NRM PARAM NOF/U: HCPCS | Performed by: INTERNAL MEDICINE

## 2019-02-12 PROCEDURE — G8427 DOCREV CUR MEDS BY ELIG CLIN: HCPCS | Performed by: INTERNAL MEDICINE

## 2019-02-12 PROCEDURE — 3017F COLORECTAL CA SCREEN DOC REV: CPT | Performed by: INTERNAL MEDICINE

## 2019-02-12 PROCEDURE — G8400 PT W/DXA NO RESULTS DOC: HCPCS | Performed by: INTERNAL MEDICINE

## 2019-02-12 PROCEDURE — 1101F PT FALLS ASSESS-DOCD LE1/YR: CPT | Performed by: INTERNAL MEDICINE

## 2019-02-12 ASSESSMENT — ENCOUNTER SYMPTOMS
EYES NEGATIVE: 1
RESPIRATORY NEGATIVE: 1
GASTROINTESTINAL NEGATIVE: 1
ALLERGIC/IMMUNOLOGIC NEGATIVE: 1

## 2022-08-04 ENCOUNTER — HOSPITAL ENCOUNTER (OUTPATIENT)
Dept: CARDIAC CATH/INVASIVE PROCEDURES | Age: 77
Discharge: HOME | End: 2022-08-04

## 2022-08-12 ENCOUNTER — HOSPITAL ENCOUNTER (OUTPATIENT)
Dept: CARDIAC CATH/INVASIVE PROCEDURES | Age: 77
Discharge: HOME | End: 2022-08-12
Payer: MEDICARE

## 2022-08-12 VITALS
RESPIRATION RATE: 19 BRPM | HEART RATE: 60 BPM | OXYGEN SATURATION: 97 % | SYSTOLIC BLOOD PRESSURE: 124 MMHG | DIASTOLIC BLOOD PRESSURE: 75 MMHG

## 2022-08-12 PROCEDURE — 7100000001 HC PACU RECOVERY - ADDTL 15 MIN

## 2022-08-12 PROCEDURE — 2709999900 HC NON-CHARGEABLE SUPPLY

## 2022-08-12 PROCEDURE — 33286 RMVL SUBQ CAR RHYTHM MNTR: CPT

## 2022-08-12 PROCEDURE — 7100000000 HC PACU RECOVERY - FIRST 15 MIN

## 2022-08-12 PROCEDURE — 6360000002 HC RX W HCPCS

## 2022-08-12 RX ORDER — ANASTROZOLE 1 MG/1
1 TABLET ORAL DAILY
COMMUNITY

## 2022-08-12 NOTE — PROCEDURES
[facility]                Procedure Note  LOOP RECORDER EXPLANT       Jeanie Duque (87 y.o., female)  1945 8/12/2022      Procedure: Loop Recorder explant     Operators:  Primary: Dr. Diogenes Chau ( Attending physician)         Pre Procedure Conscious Sedation Data:    ASA Class:    [] I [x] II [] III [] IV    Mallampati Class:  [] I [x] II [] III [] IV    Indication:  As per patient preference ( c/o discomfort at ILR site )     Device / Data:        Sedation monitoring  Loop recorder explant           Procedure  After the usual preparation of the left neck and chest, the patient was draped in the usual sterile manner. Local anesthesia was infused below the left clavicle from the midline laterally. An incision was made below the left breast, lateral to midline. The incision was dilated. The Loop recorder (Reveal) System was explanted using the standard techniques, with no bleeding or compliaction    Impression / Device:  Successful explantation of ILR   Estimated blood loss less than 10  ml     Plan:    Discharge if patient remains stable        Electronically signed by Jose Garcia MD on 8/12/2022 at 3:12 PM      I have reviewed the case / procedure with resident / fellow  I have examined the patient personally  Patient agree with treatment plan as discussed before, final arrangement based on my evaluation and exam.    Risk and benefit of procedure planned were explained in details. Procedure was performed by me personally, with all aspect of the procedure being done using standard protocol. Note was modified based on my own assessment and treatment.     David Burgess MD  [facility]

## 2022-08-12 NOTE — H&P
[facility]  Procedure History and Physical Update          Patient Name: Travis Hinkle  MRN:    [id number]  YOB: 1945  Date of evaluation:  8/12/2022    Procedure:    ILR explant    Indication for procedure: As per patient preference       Please refer to the office note completed by Dr. Hardy Medina  on 07/18/2022 in the medical record and note that:    [x] I have examined the patient and reviewed the H&P/Consult and there are no changes to be made to the assessment or plan. [] I have examined the patient and reviewed the H&P/Consult and have noted the following changes:    Past Medical History:   Diagnosis Date    Cataract     Headache(784.0)     Hypertension     Left middle cerebral artery stroke ([facility] [place] [address] 09/26/2018    Reflux     Unspecified cerebral artery occlusion with cerebral infarction        Past Surgical History:   Procedure Laterality Date    SPLENECTOMY, TOTAL         Family History   Problem Relation Age of Onset    Lung Cancer Father     Diabetes Brother     Hypertension Brother     Heart Disease Brother     Diabetes Maternal Grandmother     Heart Disease Maternal Grandfather     Heart Disease Paternal Grandfather        Allergies   Allergen Reactions    Plasminogen Activator Swelling     Lip swelling, very mild       Prior to Admission medications    Medication Sig Start Date End Date Taking? Authorizing Provider   hydrochlorothiazide (HYDRODIURIL) 25 MG tablet  10/3/18   Historical Provider, MD   apixaban (ELIQUIS STARTER PACK) 5 MG TABS tablet Take 1 tablet by mouth 2 times daily 10/3/18   Tali Olmstead DO   atorvastatin (LIPITOR) 40 MG tablet Take 1 tablet by mouth daily 10/3/18   Tali Olmstead DO   hydrALAZINE (APRESOLINE) 25 MG tablet Take 12.5 mg by mouth daily    Historical Provider, MD   lisinopril (PRINIVIL;ZESTRIL) 10 MG tablet Take 10 mg by mouth 2 times daily.       Historical Provider, MD   amLODIPine (NORVASC) 10 MG tablet Take 10 mg by mouth daily. Historical Provider, MD   omeprazole (PRILOSEC) 20 MG capsule Take 20 mg by mouth daily. Historical Provider, MD         There were no vitals filed for this visit. Constitutional and General Appearance:   alert, cooperative, no distress and appears stated age  [de-identified]:  PERRL, EOMI  Respiratory:  Normal excursion and expansion without use of accessory muscles  Resp Auscultation:  Good respiratory effort. No for increased work of breathing. On auscultation: clear to auscultation bilaterally  Cardiovascular:  Regular rate and rhythm. S1/S2  No murmurs. The apical impulse is not displaced  Abdomen:  Soft  Bowel sounds present  Non-tender to palpation  Extremities:  No cyanosis or clubbing  Lower extremity edema: No.  Skin:  Warm and dry  Neurological:  Alert and oriented. Moves all extremities well    1 ILR explant   2 HTN   3 Afib   4 H/o CVA   Plan:  Proceed with planned procedure. Further orders to follow. Pre Procedure Conscious Sedation Data:     ASA Class:                  [] I [] II [x] III [] IV     Mallampati Class:       [] I [] II [x] III [] IV    . Risks, benefits, alternatives, and details discussed extensively. Accepts and consents.       Electronically signed on 08/12/22 at 8:29 AM by:    Lara Weir MD, MD   Fellow, [address]

## 2022-08-12 NOTE — DISCHARGE INSTRUCTIONS
INTERNAL LOOP RECORDER /       - NO DRIVING 24 HOURS IF SEDATION RECEIVED    - DRESSING TO BE REMOVED *** AND LEFT OPEN TO THE AIR    -GLUE USED TO CLOSE INCISION AND WILL PEEL OFF DO NOT PICK AT IT     -OK TO SHOWER THE FOLLOWING DAY BUT DO NOT LET WATER HIT DIRECTLY ON SITE FOR ANY LENGTH OF TIME FOR FIRST WEEK    -DO NOT RUB/SCRUB SITE    -NO SWIMMING IN POOLS/HOT TUBS FOR 7 TO 10 DAYS     -AVOID CLOTHING THAT MIGHT RUB ON INCISION    -WATCH FOR SIGNS OF INFECTION - REDNESS, SWELLING, DRAINAGE AT SITE AND /OR TEMPERATURE GREATER THAN 101    NOTIFY YOUR PHYSICIAN IMMEDIATELY IF SIGNS OF INFECTION OTHERWISE  -CALL FOR FURTHER FOLLOW UP ***                      WHAT IS AN INSERTABLE CARDIAC MONITOR    An insertable cardiac monitor is a small device that continuously monitors heart rhythms and records them automatically or by using a hand-held patient assistant. The device is inserted just beneath the skin, during a short outpatient procedure. HOW DOES AN INSERTABLE CARDIAC MONITOR WORK? An implantable loop recorder, like a Holter monitor and event monitor, reads your hearts electrical activity. Your heart has an electrical system that functions like a natural pacemaker. Electrical signals travel through the chambers of the heart to cause it to beat. If the hearts natural electrical system is not functioning correctly, an abnormal heartbeat (arrhythmia) may result. The implantable loop recorder continuously monitors the electrical activity of the heart and automatically begins recording information when the hearts patterns change. Alternately, you may be instructed by your physician to turn on the devices recording function by using an externalactivator, a hand-held device that you hold over the site on your chest where the recorder has been implanted. Once the cardiac monitor is inserted, it is programmed to continuously monitor your hearts activity.      IF I GET AN INSERTABLE CARDIAC MONITOR WILL I